# Patient Record
Sex: MALE | Race: WHITE | NOT HISPANIC OR LATINO | Employment: PART TIME | URBAN - METROPOLITAN AREA
[De-identification: names, ages, dates, MRNs, and addresses within clinical notes are randomized per-mention and may not be internally consistent; named-entity substitution may affect disease eponyms.]

---

## 2017-01-11 ENCOUNTER — OFFICE VISIT (OUTPATIENT)
Dept: FAMILY MEDICINE | Facility: CLINIC | Age: 47
End: 2017-01-11
Payer: COMMERCIAL

## 2017-01-11 VITALS
HEART RATE: 94 BPM | SYSTOLIC BLOOD PRESSURE: 136 MMHG | RESPIRATION RATE: 16 BRPM | TEMPERATURE: 98.4 F | BODY MASS INDEX: 40.62 KG/M2 | DIASTOLIC BLOOD PRESSURE: 84 MMHG | WEIGHT: 267.1 LBS

## 2017-01-11 DIAGNOSIS — E66.01 MORBID OBESITY DUE TO EXCESS CALORIES (H): ICD-10-CM

## 2017-01-11 DIAGNOSIS — M54.40 ACUTE RIGHT-SIDED LOW BACK PAIN WITH SCIATICA, SCIATICA LATERALITY UNSPECIFIED: Primary | ICD-10-CM

## 2017-01-11 PROBLEM — M51.369 DDD (DEGENERATIVE DISC DISEASE), LUMBAR: Status: ACTIVE | Noted: 2017-01-11

## 2017-01-11 PROCEDURE — 99213 OFFICE O/P EST LOW 20 MIN: CPT | Performed by: FAMILY MEDICINE

## 2017-01-11 RX ORDER — MELOXICAM 15 MG/1
15 TABLET ORAL DAILY
Qty: 20 TABLET | Refills: 1 | Status: SHIPPED | OUTPATIENT
Start: 2017-01-11 | End: 2017-05-03

## 2017-01-11 NOTE — Clinical Note
[facility]  [address] [place], [address]  [phone]        January 11, 2017    Martir Barrientos                                                                                                                                                       [address] [facility] [address]      Off work 1/9 to 1/11 for illness.           Miguel James MD

## 2017-01-11 NOTE — PROGRESS NOTES
SUBJECTIVE:                                                    Martir Barrientos is a 46 year old male who presents to clinic today for the following health issues:  Back Subjective:         Symptoms began:   3 year(s) ago       Symptoms changing:  onset gradual and are worse.                  Location:  low back bilateral region       Radiation to does not radiate       At worst a 6 on a scale of 1-10.         Personal hx of back pain is:  recurrent self limited episodes of low back pain in the past.       Pain is exacerbated by: his morbid obesity .       Pain is relieved by: lying down.       Associated sx include: none.       Previous plain films obtained: No.        Results: done at  show L5-S1 ddd.       Red flag symptoms: negative    He's missed two days of his sedentary job and sees a DC but has not exercise therapy. He wants his absence certified.   Body mass index is 40.62 kg/(m^2).  Jarad discussion of his morbid obesity and the continue ddd issues that have and will come with it. Offered PT referral and [facility].   (M54.40) Acute right-sided low back pain with sciatica, sciatica laterality unspecified  (primary encounter diagnosis)  Comment:   Plan: meloxicam (MOBIC) 15 MG tablet          He'll consider his treatment options     .

## 2017-01-11 NOTE — NURSING NOTE
"Chief Complaint   Patient presents with     Back Pain       Initial /84 mmHg  Pulse 94  Temp(Src) 98.4  F (36.9  C) (Oral)  Resp 16  Wt 267 lb 1.6 oz (121.156 kg) Estimated body mass index is 40.62 kg/(m^2) as calculated from the following:    Height as of 12/16/16: 5' 8\" (1.727 m).    Weight as of this encounter: 267 lb 1.6 oz (121.156 kg).  BP completed using cuff size: regular  Guilherme Claire CMA       "

## 2017-01-18 ENCOUNTER — OFFICE VISIT (OUTPATIENT)
Dept: FAMILY MEDICINE | Facility: CLINIC | Age: 47
End: 2017-01-18
Payer: COMMERCIAL

## 2017-01-18 VITALS
DIASTOLIC BLOOD PRESSURE: 70 MMHG | TEMPERATURE: 98.9 F | OXYGEN SATURATION: 98 % | SYSTOLIC BLOOD PRESSURE: 126 MMHG | WEIGHT: 263.13 LBS | BODY MASS INDEX: 43.84 KG/M2 | HEART RATE: 82 BPM | RESPIRATION RATE: 18 BRPM | HEIGHT: 65 IN

## 2017-01-18 DIAGNOSIS — M54.50 CHRONIC MIDLINE LOW BACK PAIN WITHOUT SCIATICA: Primary | ICD-10-CM

## 2017-01-18 DIAGNOSIS — G89.29 CHRONIC MIDLINE LOW BACK PAIN WITHOUT SCIATICA: Primary | ICD-10-CM

## 2017-01-18 PROCEDURE — 99213 OFFICE O/P EST LOW 20 MIN: CPT | Performed by: PHYSICIAN ASSISTANT

## 2017-01-18 RX ORDER — LIDOCAINE 50 MG/G
PATCH TOPICAL
Qty: 30 PATCH | Refills: 1 | Status: SHIPPED | OUTPATIENT
Start: 2017-01-18 | End: 2017-08-27

## 2017-01-18 RX ORDER — METHYLPREDNISOLONE 4 MG
TABLET, DOSE PACK ORAL
Qty: 21 TABLET | Refills: 0 | Status: SHIPPED | OUTPATIENT
Start: 2017-01-18 | End: 2017-04-12

## 2017-01-18 NOTE — Clinical Note
[facility]  [address] [facility] [address]  Phone: [phone]    January 18, 2017        Martir Barrientos  [address] [facility] [address]          To whom it may concern:    RE: Martir Barrientos    Patient was seen and treated today at our clinic and missed work.    Please contact me for questions or concerns.      Sincerely,        Elizabeth Mcneal PA-C

## 2017-01-18 NOTE — PROGRESS NOTES
SUBJECTIVE:                                                    Martir Barrientos is a 46 year old male who presents to clinic today for the following health issues:      Back Pain      Duration: x 1 month        Specific cause: none    Description:   Location of pain: low back left  Character of pain: sharp  Pain radiation:none  New numbness or weakness in legs, not attributed to pain:  no     Intensity: moderate    History:   Pain interferes with job: YES  History of back problems: no prior back problems  Any previous MRI or X-rays: Yes--at [facility].  Date December 2016  Sees a specialist for back pain:  No  Therapies tried without relief: Prednisone    Alleviating factors:   Improved by: Lidocaine 5% Patch      Precipitating factors:  Worsened by: Going from sitting to standing    Functional and Psychosocial Screen (Ko STarT Back):      Most recent score:    KO START BACK TOTAL SCORE 1/18/2017   Total Score (all 9) 5            Accompanying Signs & Symptoms:  Risk of Fracture:  None  Risk of Cauda Equina:  None  Risk of Infection:  None  Risk of Cancer:  None  Risk of Ankylosing Spondylitis:  Onset at age <35, male, AND morning back stiffness. no                      Patient is here today for ongoing back pain  He states this has been going on since December  He denies injury, woke up with 7-8/10 back pain  No shooting pain into legs, no numbness or tingling, no loss of bowel or bladder control  Has been seen multiple times, given anti-inflammatories, pain medication, muscle relaxants and patches  He states that he had an xray which showed arthritis  Notes pain worsens with change in the weather      Problem list and histories reviewed & adjusted, as indicated.  Additional history: as documented    Patient Active Problem List   Diagnosis     Morbid obesity (H)     DDD (degenerative disc disease), lumbar     No past surgical history on file.    Social History   Substance Use Topics  "    Smoking status: Former Smoker     Smokeless tobacco: Never Used     Alcohol Use: No     Family History   Problem Relation Age of Onset     Family History Negative Mother      Family History Negative Father          Current Outpatient Prescriptions   Medication Sig Dispense Refill     methylPREDNISolone (MEDROL DOSEPAK) 4 MG tablet Follow package instructions 21 tablet 0     lidocaine (LIDODERM) 5 % Patch Apply up to 3 patches to painful area at once for up to 12 h within a 24 h period.  Remove after 12 hours. 30 patch 1     fexofenadine-pseudoePHEDrine (ALLEGRA-D 12 HOUR)  MG per tablet Take 1 tablet by mouth 2 times daily 60 tablet 6     meloxicam (MOBIC) 15 MG tablet Take 1 tablet (15 mg) by mouth daily 20 tablet 1     methocarbamol (ROBAXIN) 750 MG tablet Take 1 tablet (750 mg) by mouth 3 times daily as needed 30 tablet 0     oxyCODONE-acetaminophen (PERCOCET) 5-325 MG per tablet Take 1 tablet by mouth every 6 hours as needed for pain 15 tablet 0     HYDROcodone-acetaminophen (NORCO) 5-325 MG per tablet 1/2-1 tab po every 4-6 hours PRN severe pain 15 tablet 0     fluticasone (FLONASE) 50 MCG/ACT nasal spray Spray 2 sprays into both nostrils daily 16 g 0     Lidocaine HCl 3 % LOTN Externally apply topically 2 times daily 1 Bottle 2     Allergies   Allergen Reactions     Penicillins        ROS:  Constitutional, HEENT, cardiovascular, pulmonary, gi and gu systems are negative, except as otherwise noted.    OBJECTIVE:                                                    /70 mmHg  Pulse 82  Temp(Src) 98.9  F (37.2  C) (Tympanic)  Resp 18  Ht 5' 5\" (1.651 m)  Wt 263 lb 2 oz (119.353 kg)  BMI 43.79 kg/m2  SpO2 98%  Body mass index is 43.79 kg/(m^2).  GENERAL: healthy, alert and no distress  NECK: no adenopathy, no asymmetry, masses, or scars and thyroid normal to palpation  RESP: lungs clear to auscultation - no rales, rhonchi or wheezes  CV: regular rate and rhythm, normal S1 S2, no S3 or S4, no " murmur, click or rub, no peripheral edema and peripheral pulses strong  ABDOMEN: soft, nontender, no hepatosplenomegaly, no masses and bowel sounds normal  MS: no gross musculoskeletal defects noted, no edema  Comprehensive back pain exam:  Tenderness of midspine in lumbar region, Range of motion not limited by pain, Lower extremity strength functional and equal on both sides and Straight leg raise negative bilaterally    Diagnostic Test Results:  none      ASSESSMENT/PLAN:                                                            1. Chronic midline low back pain without sciatica  Chronic issue, new to me, will trial Medrol Dosepack and refill the Lidocaine patches. Discussed with patient long term treatment of arthritis in back is weight loss and PT to encourage good low back mechanisms. Advised f/u with Ortho if symptoms persist or do not improve.  - methylPREDNISolone (MEDROL DOSEPAK) 4 MG tablet; Follow package instructions  Dispense: 21 tablet; Refill: 0  - [facility], AND CHIR[facility]RACTIC REFERRAL    Risks, benefits and alternatives were discussed with patient. Agreeable to the plan of care.      Elizabeth Mcneal PA-C  [facility]

## 2017-01-18 NOTE — MR AVS SNAPSHOT
After Visit Summary   1/18/2017    Martir Barrientos    MRN: [id number]           Patient Information     Date Of Birth          1970        Visit Information        Provider Department      1/18/2017 2:30 PM Elizabeth Mcneal PA-C [facility]        Today's Diagnoses     Acute midline low back pain without sciatica    -  1        Follow-ups after your visit        Additional Services     [facility], AND CHIR[facility]RACTIC REFERRAL       **This order will print in the [facility] Scheduling Office**    Physical Therapy, Hand Therapy and Chiropractic Care are available through:    *[facility]  *[facility]  *[facility]    Call one number to schedule at any of the above locations: [phone].    Your provider has referred you to: Physical Therapy at [facility] or [facility]    Indication/Reason for Referral: Low Back Pain  Onset of Illness: 1 month  Therapy Orders: Evaluate and Treat  Special Programs:   Special Request:     Ko Sub-Score (Q5-9): 3 (01/18/17 1400)  Ko Total Score (all 9): 5 (01/18/17 1400)    Additional Comments for the Therapist or Chiropractor:     Please be aware that coverage of these services is subject to the terms and limitations of your health insurance plan.  Call member services at your health plan with any benefit or coverage questions.      Please bring the following to your appointment:    *Your personal calendar for scheduling future appointments  *Comfortable clothing                  Who to contact     If you have questions or need follow up information about today's clinic visit or your schedule please contact [facility] directly at [phone].  Normal or non-critical lab and imaging results will be communicated to you by MyChart, letter or phone within 4 business days after the clinic has received the results. If you do not hear from us within 7 days, please contact the clinic  "through [employer] or phone. If you have a critical or abnormal lab result, we will notify you by phone as soon as possible.  Submit refill requests through Lapio or call your pharmacy and they will forward the refill request to us. Please allow 3 business days for your refill to be completed.          Additional Information About Your Visit        [employer] Information     Lapio lets you send messages to your doctor, view your test results, renew your prescriptions, schedule appointments and more. To sign up, go to [email or web address] . Click on \"Log in\" on the left side of the screen, which will take you to the Welcome page. Then click on \"Sign up Now\" on the right side of the page.     You will be asked to enter the access code listed below, as well as some personal information. Please follow the directions to create your username and password.     Your access code is: [email or web address]  Expires: 2017  2:33 PM     Your access code will  in 90 days. If you need help or a new code, please call your [facility] clinic or [phone].        Care EveryWhere ID     This is your Care EveryWhere ID. This could be used by other organizations to access your [facility] medical records  [id number]        Your Vitals Were     Pulse Temperature Respirations Height BMI (Body Mass Index) Pulse Oximetry    82 98.9  F (37.2  C) (Tympanic) 18 5' 5\" (1.651 m) 43.79 kg/m2 98%       Blood Pressure from Last 3 Encounters:   17 126/70   17 136/84   16 118/80    Weight from Last 3 Encounters:   17 263 lb 2 oz (119.353 kg)   17 267 lb 1.6 oz (121.156 kg)   16 267 lb (121.11 kg)              We Performed the Following     [facility], [facility] CH[facility]RACTIC REFERRAL          Where to get your medicines      These medications were sent to [id number] - [place], [place] - [address] [facility]  [address] [address]     Phone:  [phone]    - methylPREDNISolone 4 MG tablet    "   Call your pharmacy to confirm that your medication is ready for pickup. It may take up to 24 hours for them to receive the prescription. If the prescription is not ready within 3 business days, please contact your clinic or your provider.     We will let you know when these medications are ready. If you don't hear back within 3 business days, please contact us.    - lidocaine 5 % Patch       Primary Care Provider    Drew  Provider       No address on file        Thank you!     Thank you for choosing [facility]  for your care. Our goal is always to provide you with excellent care. Hearing back from our patients is one way we can continue to improve our services. Please take a few minutes to complete the written survey that you may receive in the mail after your visit with us. Thank you!             Your Updated Medication List - Protect others around you: Learn how to safely use, store and throw away your medicines at www.disposemymeds.org.          This list is accurate as of: 1/18/17  2:33 PM.  Always use your most recent med list.                   Brand Name Dispense Instructions for use    fexofenadine-pseudoePHEDrine  MG per 12 hr tablet    ALLEGRA-D 12 HOUR    60 tablet    Take 1 tablet by mouth 2 times daily       fluticasone 50 MCG/ACT spray    FLONASE    16 g    Spray 2 sprays into both nostrils daily       HYDROcodone-acetaminophen 5-325 MG per tablet    NORCO    15 tablet    1/2-1 tab po every 4-6 hours PRN severe pain       lidocaine 5 % Patch    LIDODERM    30 patch    Apply up to 3 patches to painful area at once for up to 12 h within a 24 h period.  Remove after 12 hours.       Lidocaine HCl 3 % Lotn     1 Bottle    Externally apply topically 2 times daily       meloxicam 15 MG tablet    MOBIC    20 tablet    Take 1 tablet (15 mg) by mouth daily       methocarbamol 750 MG tablet    ROBAXIN    30 tablet    Take 1 tablet (750 mg) by mouth 3 times daily as needed        methylPREDNISolone 4 MG tablet    MEDROL DOSEPAK    21 tablet    Follow package instructions       oxyCODONE-acetaminophen 5-325 MG per tablet    PERCOCET    15 tablet    Take 1 tablet by mouth every 6 hours as needed for pain

## 2017-01-19 ENCOUNTER — TELEPHONE (OUTPATIENT)
Dept: FAMILY MEDICINE | Facility: CLINIC | Age: 47
End: 2017-01-19

## 2017-01-19 NOTE — TELEPHONE ENCOUNTER
PRIOR AUTHORIZATION DENIED    Medication: lidocaine (LIDODERM) 5 % Patch - EPA DENIED    Denial Date: 1/19/2017    EPA DENIED: [facility] approves this medication for patients with pain associated with a diagnosis of Post-herpetic Neuralgia (pain after shingles), pain associated with cancer-related neuropathy (including treatment-related neuropathy [e.g. neuropathy associated with radiation treatment or chemotherapy]) or Diabetic Neuropathy.     Appeal Information:     [employer]    Prescription Claim Appeals  [address] [facility],     [address] [address]  [place] [address]  Fax:[phone]

## 2017-01-20 NOTE — TELEPHONE ENCOUNTER
Please encourage patient to purchase over the counter lidoderm patch (4%). Elizabeth Mcneal PA-C

## 2017-03-23 ENCOUNTER — OFFICE VISIT (OUTPATIENT)
Dept: URGENT CARE | Facility: URGENT CARE | Age: 47
End: 2017-03-23
Payer: COMMERCIAL

## 2017-03-23 VITALS
OXYGEN SATURATION: 100 % | HEART RATE: 83 BPM | BODY MASS INDEX: 43.1 KG/M2 | TEMPERATURE: 97.9 F | SYSTOLIC BLOOD PRESSURE: 124 MMHG | DIASTOLIC BLOOD PRESSURE: 70 MMHG | WEIGHT: 259 LBS

## 2017-03-23 DIAGNOSIS — G89.29 CHRONIC MIDLINE LOW BACK PAIN WITHOUT SCIATICA: Primary | ICD-10-CM

## 2017-03-23 DIAGNOSIS — M54.50 CHRONIC MIDLINE LOW BACK PAIN WITHOUT SCIATICA: Primary | ICD-10-CM

## 2017-03-23 DIAGNOSIS — M77.8 TENDINITIS OF RIGHT SHOULDER: ICD-10-CM

## 2017-03-23 PROCEDURE — 99213 OFFICE O/P EST LOW 20 MIN: CPT | Performed by: PHYSICIAN ASSISTANT

## 2017-03-23 RX ORDER — LIDOCAINE 50 MG/G
PATCH TOPICAL
Qty: 42 PATCH | Refills: 2 | Status: SHIPPED | OUTPATIENT
Start: 2017-03-23 | End: 2017-04-12

## 2017-03-23 ASSESSMENT — ENCOUNTER SYMPTOMS
NAUSEA: 0
DYSURIA: 0
CHILLS: 0
HEMATURIA: 0
FREQUENCY: 0
FOCAL WEAKNESS: 0
SHORTNESS OF BREATH: 0
FEVER: 0
BACK PAIN: 1
VOMITING: 0
ABDOMINAL PAIN: 0
DIARRHEA: 0

## 2017-03-23 NOTE — PROGRESS NOTES
"HPI  March 23, 2017    HPI: Martir Barrientos is a 47 year old male who complains of moderate R shoulder pain and low back pain which has been ongoing for the past 6-7 months but has been worse in the past week with the cold weather. He has been seen by ortho and diagnosed with \"tendonitis and arthritis\" in his lumbar spine and right shoulder. Denies new injury or trauma. He is currently going to PT which provides some relief but not when the weather is cold. He uses lidocaine patches but used his last one yesterday. Pt reports he has had steroid injections but was told by ortho he needs to \"take a break\" from them. Symptoms are constant in duration, worse with movement. No radiation of the pain. Denies fever/chills, bowel/bladder incontinence, saddle anesthesia, urinary sx, numbness, weakness, HA, N/V, or any other symptoms.    No past medical history on file.  No past surgical history on file.  Social History   Substance Use Topics     Smoking status: Former Smoker     Smokeless tobacco: Never Used     Alcohol use No       Problem list, Medication list, Allergies, and Medical/Social/Surgical histories reviewed in [facility] and updated as appropriate.      Review of Systems   Constitutional: Negative for chills and fever.   Respiratory: Negative for shortness of breath.    Cardiovascular: Negative for chest pain.   Gastrointestinal: Negative for abdominal pain, diarrhea, nausea and vomiting.   Genitourinary: Negative for dysuria, frequency, hematuria and urgency.   Musculoskeletal: Positive for back pain and joint pain.   Neurological: Negative for focal weakness.        No bowel or bladder incontinence. No saddle anesthesia   All other systems reviewed and are negative.        Physical Exam   Constitutional: He is oriented to person, place, and time and well-developed, well-nourished, and in no distress.   HENT:   Head: Normocephalic and atraumatic.   Cardiovascular:   Pulses:       Radial pulses are 2+ on the " right side, and 2+ on the left side.        Dorsalis pedis pulses are 2+ on the right side, and 2+ on the left side.   Musculoskeletal:        Right shoulder: He exhibits tenderness. He exhibits no bony tenderness.        Cervical back: Normal.        Thoracic back: Normal.        Lumbar back: He exhibits tenderness and pain.        Back:         Arms:  Dorsiflexion intact bilaterally. Negative SLR. Patient ambulatory.   Neurological: He is oriented to person, place, and time. He displays no weakness. Gait normal.   Strength 5/5 BLE   Nursing note and vitals reviewed.      Vital Signs  /70  Pulse 83  Temp 97.9  F (36.6  C) (Tympanic)  Wt 259 lb (117.5 kg)  SpO2 100%  BMI 43.1 kg/m2     Diagnostic Test Results:  none     ASSESSMENT/PLAN      ICD-10-CM    1. Chronic midline low back pain without sciatica M54.5 lidocaine (LIDODERM) 5 % Patch    G89.29    2. Tendinitis of right shoulder M75.81 lidocaine (LIDODERM) 5 % Patch        Pain c/w patient's chronic pain. Rx lidocaine patches, continue PT, f/u with ortho.    I have discussed any lab or imaging results, the patient's diagnosis, and my plan of treatment with the patient and/or family. Patient is aware to come back in if with worsening symptoms or if no relief despite treatment plan.  Patient voiced understanding and had no further questions.       Follow Up: Return if symptoms worsen or fail to improve.    GERA Live, [facility]

## 2017-03-23 NOTE — NURSING NOTE
"Chief Complaint   Patient presents with     Urgent Care     Tendon Injury     Pt has tendonitis in shoulder and lower lumbar, since its been cold its been very sore. 7-8/10       Initial /70  Pulse 83  Temp 97.9  F (36.6  C) (Tympanic)  Wt 259 lb (117.5 kg)  SpO2 100%  BMI 43.1 kg/m2 Estimated body mass index is 43.1 kg/(m^2) as calculated from the following:    Height as of 1/18/17: 5' 5\" (1.651 m).    Weight as of this encounter: 259 lb (117.5 kg).  Medication Reconciliation: complete    Salud Goodwin   "

## 2017-03-23 NOTE — LETTER
[facility]  [address]  [address]  [facility] [place] [address]  Phone: [phone]  Fax: [phone]    March 23, 2017        Martir Barrientos  [address] [address]          To whom it may concern:    RE: Martir Barrientos    Patient was seen and treated today at our clinic and missed work. Please excuse his absence for today.    Please contact me for questions or concerns.      Sincerely,        Blaire Horton PA-C

## 2017-03-23 NOTE — PATIENT INSTRUCTIONS
Follow up with primary care in 3-4 days if symptoms have not improved. Return to clinic or go to ER if symptoms worsen.      Relieving Back Pain  Back pain is a common problem. You can strain back muscles by lifting too much weight or just by moving the wrong way. Back strain can be uncomfortable, even painful. And it can take weeks or months to improve. To help yourself feel better and prevent future back strains, try these tips.  Important Note: Do not give aspirin to children or teens without first discussing it with your healthcare provider.     Ice    Ice reduces muscle pain and swelling. It helps most during the first 24 to 48 hours after an injury.    Wrap an ice pack or a bag of frozen peas in a thin towel. (Never place ice directly on your skin.)    Place the ice where your back hurts the most.    Don t ice for more than 20 minutes at a time.    You can use ice several times a day.     Medicines  Over-the-counter pain relievers can include acetaminophen and anti-inflammatory medicines, which includes aspirin or ibuprofen. They can help ease discomfort. Some also reduce swelling.    Tell your healthcare provider about any medicines you are already taking.    Take medicines only as directed.    Heat  After the first 48 hours, heat can relax sore muscles and improve blood flow.    Try a warm bath or shower. Or use a heating pad set on low. To prevent a burn, keep a cloth between you and the heating pad.    Don t use a heating pad for more than 15 minutes at a time. Never sleep on a heating pad.    1443-3068 The [employer]. [address] [place] [address], [place] [id number]. All rights reserved. This information is not intended as a substitute for professional medical care. Always follow your healthcare professional's instructions.      Tendonitis  A tendon is the thick fibrous cord that joins muscle to bone and allows joints to move. When a tendon becomes inflamed, it is called tendonitis. This can occur  from overuse, injury, or infection. This usually involves the shoulders, forearm, wrist, hands and foot. Symptoms include pain, swelling and tenderness to the touch. Moving the joint increases the pain.  It takes 4 to 6 weeks for tendonitis to heal. It is treated by preventing motion of the tendon with a splint or brace and the use of anti-inflammatory medicine.  Home care    Some people find relief with ice packs. These can be crushed or cubed ice in a plastic bag or a bag of frozen vegetables wrapped in a thin towel. Other people get better relief with heat. This can include a hot shower, hot bath, or a moist towel warmed in a microwave. Try each and use the method that feels best, for 15 to 20 minutes several times a day.    Rest the inflamed joint and protect it from movement.    You may use over-the-counter ibuprofen or naproxen to treat pain and inflammation, unless another medicine was prescribed. If you can't take these medicines, acetaminophen may help with the pain, but does not treat inflammation. If you have chronic liver or kidney disease or ever had a stomach ulcer or gastrointestinal bleeding, talk with your doctor before using these medicines.    As your symptoms improve, begin gradual motion at the involved joint.  Follow-up care  Follow up with your healthcare provider if you are not improving after 5 days of treatment.  When to seek medical advice  Call your healthcare provider right away if any of these occur:    Redness over the painful area    Increasing pain or swelling at the joint    Fever (1 degree above your normal temperature) lasting 24 to 48 hours Or, whatever your healthcare provider told you to report based on your condition    0232-8683 [employer]. [address] [place] [address], [place] [address]. All rights reserved. This information is not intended as a substitute for professional medical care. Always follow your healthcare professional's instructions.

## 2017-03-23 NOTE — MR AVS SNAPSHOT
After Visit Summary   3/23/2017    Martir Barrientos    MRN: [id number]           Patient Information     Date Of Birth          1970        Visit Information        Provider Department      3/23/2017 2:30 PM Blaire Horton PA-C [facility]        Today's Diagnoses     Chronic midline low back pain without sciatica    -  1    Tendinitis of right shoulder          Care Instructions    Follow up with primary care in 3-4 days if symptoms have not improved. Return to clinic or go to ER if symptoms worsen.      Relieving Back Pain  Back pain is a common problem. You can strain back muscles by lifting too much weight or just by moving the wrong way. Back strain can be uncomfortable, even painful. And it can take weeks or months to improve. To help yourself feel better and prevent future back strains, try these tips.  Important Note: Do not give aspirin to children or teens without first discussing it with your healthcare provider.     Ice    Ice reduces muscle pain and swelling. It helps most during the first 24 to 48 hours after an injury.    Wrap an ice pack or a bag of frozen peas in a thin towel. (Never place ice directly on your skin.)    Place the ice where your back hurts the most.    Don t ice for more than 20 minutes at a time.    You can use ice several times a day.     Medicines  Over-the-counter pain relievers can include acetaminophen and anti-inflammatory medicines, which includes aspirin or ibuprofen. They can help ease discomfort. Some also reduce swelling.    Tell your healthcare provider about any medicines you are already taking.    Take medicines only as directed.    Heat  After the first 48 hours, heat can relax sore muscles and improve blood flow.    Try a warm bath or shower. Or use a heating pad set on low. To prevent a burn, keep a cloth between you and the heating pad.    Don t use a heating pad for more than 15 minutes at a time. Never sleep on a  heating pad.    0771-4056 [employer]. [address] [place] [address], [place] [address]. All rights reserved. This information is not intended as a substitute for professional medical care. Always follow your healthcare professional's instructions.      Tendonitis  A tendon is the thick fibrous cord that joins muscle to bone and allows joints to move. When a tendon becomes inflamed, it is called tendonitis. This can occur from overuse, injury, or infection. This usually involves the shoulders, forearm, wrist, hands and foot. Symptoms include pain, swelling and tenderness to the touch. Moving the joint increases the pain.  It takes 4 to 6 weeks for tendonitis to heal. It is treated by preventing motion of the tendon with a splint or brace and the use of anti-inflammatory medicine.  Home care    Some people find relief with ice packs. These can be crushed or cubed ice in a plastic bag or a bag of frozen vegetables wrapped in a thin towel. Other people get better relief with heat. This can include a hot shower, hot bath, or a moist towel warmed in a microwave. Try each and use the method that feels best, for 15 to 20 minutes several times a day.    Rest the inflamed joint and protect it from movement.    You may use over-the-counter ibuprofen or naproxen to treat pain and inflammation, unless another medicine was prescribed. If you can't take these medicines, acetaminophen may help with the pain, but does not treat inflammation. If you have chronic liver or kidney disease or ever had a stomach ulcer or gastrointestinal bleeding, talk with your doctor before using these medicines.    As your symptoms improve, begin gradual motion at the involved joint.  Follow-up care  Follow up with your healthcare provider if you are not improving after 5 days of treatment.  When to seek medical advice  Call your healthcare provider right away if any of these occur:    Redness over the painful area    Increasing pain or  "swelling at the joint    Fever (1 degree above your normal temperature) lasting 24 to 48 hours Or, whatever your healthcare provider told you to report based on your condition    6702-7635 [employer]. [address] [place] [address], [place] [address]. All rights reserved. This information is not intended as a substitute for professional medical care. Always follow your healthcare professional's instructions.                Follow-ups after your visit        Follow-up notes from your care team     Return if symptoms worsen or fail to improve.      Who to contact     If you have questions or need follow up information about today's clinic visit or your schedule please contact [facility] directly at [phone].  Normal or non-critical lab and imaging results will be communicated to you by MyChart, letter or phone within 4 business days after the clinic has received the results. If you do not hear from us within 7 days, please contact the clinic through [employer] or phone. If you have a critical or abnormal lab result, we will notify you by phone as soon as possible.  Submit refill requests through [employer] or call your pharmacy and they will forward the refill request to us. Please allow 3 business days for your refill to be completed.          Additional Information About Your Visit        MyChart Information     You[employer] lets you send messages to your doctor, view your test results, renew your prescriptions, schedule appointments and more. To sign up, go to [email or web address] . Click on \"Log in\" on the left side of the screen, which will take you to the Welcome page. Then click on \"Sign up Now\" on the right side of the page.     You will be asked to enter the access code listed below, as well as some personal information. Please follow the directions to create your username and password.     Your access code is: [email or web address]  Expires: 2017  3:33 PM     Your access code will  in 90 " days. If you need help or a new code, please call your [place] clinic or [phone].        Care EveryWhere ID     This is your Care EveryWhere ID. This could be used by other organizations to access your [place] medical records  [id number]        Your Vitals Were     Pulse Temperature Pulse Oximetry BMI (Body Mass Index)          83 97.9  F (36.6  C) (Tympanic) 100% 43.1 kg/m2         Blood Pressure from Last 3 Encounters:   03/23/17 124/70   01/18/17 126/70   01/11/17 136/84    Weight from Last 3 Encounters:   03/23/17 259 lb (117.5 kg)   01/18/17 263 lb 2 oz (119.4 kg)   01/11/17 267 lb 1.6 oz (121.2 kg)              Today, you had the following     No orders found for display         Today's Medication Changes          These changes are accurate as of: 3/23/17  3:44 PM.  If you have any questions, ask your nurse or doctor.               These medicines have changed or have updated prescriptions.        Dose/Directions    * lidocaine 5 % Patch   Commonly known as:  LIDODERM   This may have changed:  Another medication with the same name was added. Make sure you understand how and when to take each.   Changed by:  Elizabeth Mcneal PA-C        Apply up to 3 patches to painful area at once for up to 12 h within a 24 h period.  Remove after 12 hours.   Quantity:  30 patch   Refills:  1       * lidocaine 5 % Patch   Commonly known as:  LIDODERM   This may have changed:  You were already taking a medication with the same name, and this prescription was added. Make sure you understand how and when to take each.   Used for:  Chronic midline low back pain without sciatica, Tendinitis of right shoulder   Changed by:  Blaire Horton PA-C        Apply up to 3 patches to affected area at once for up to 12 h within a 24 h period.   Quantity:  42 patch   Refills:  2       * Notice:  This list has 2 medication(s) that are the same as other medications prescribed for you. Read the directions carefully,  and ask your doctor or other care provider to review them with you.         Where to get your medicines      Call your pharmacy to confirm that your medication is ready for pickup. It may take up to 24 hours for them to receive the prescription. If the prescription is not ready within 3 business days, please contact your clinic or your provider.     We will let you know when these medications are ready. If you don't hear back within 3 business days, please contact us.     lidocaine 5 % Patch                Primary Care Provider    Drew  Provider       No address on file        Thank you!     Thank you for choosing [facility]  for your care. Our goal is always to provide you with excellent care. Hearing back from our patients is one way we can continue to improve our services. Please take a few minutes to complete the written survey that you may receive in the mail after your visit with us. Thank you!             Your Updated Medication List - Protect others around you: Learn how to safely use, store and throw away your medicines at www.disposemymeds.org.          This list is accurate as of: 3/23/17  3:44 PM.  Always use your most recent med list.                   Brand Name Dispense Instructions for use    fexofenadine-pseudoePHEDrine  MG per 12 hr tablet    ALLEGRA-D 12 HOUR    60 tablet    Take 1 tablet by mouth 2 times daily       fluticasone 50 MCG/ACT spray    FLONASE    16 g    Spray 2 sprays into both nostrils daily       HYDROcodone-acetaminophen 5-325 MG per tablet    NORCO    15 tablet    1/2-1 tab po every 4-6 hours PRN severe pain       * lidocaine 5 % Patch    LIDODERM    30 patch    Apply up to 3 patches to painful area at once for up to 12 h within a 24 h period.  Remove after 12 hours.       * lidocaine 5 % Patch    LIDODERM    42 patch    Apply up to 3 patches to affected area at once for up to 12 h within a 24 h period.       Lidocaine HCl 3 % Lotn     1 Bottle    Externally  apply topically 2 times daily       meloxicam 15 MG tablet    MOBIC    20 tablet    Take 1 tablet (15 mg) by mouth daily       methocarbamol 750 MG tablet    ROBAXIN    30 tablet    Take 1 tablet (750 mg) by mouth 3 times daily as needed       methylPREDNISolone 4 MG tablet    MEDROL DOSEPAK    21 tablet    Follow package instructions       oxyCODONE-acetaminophen 5-325 MG per tablet    PERCOCET    15 tablet    Take 1 tablet by mouth every 6 hours as needed for pain       * Notice:  This list has 2 medication(s) that are the same as other medications prescribed for you. Read the directions carefully, and ask your doctor or other care provider to review them with you.

## 2017-04-05 ENCOUNTER — TELEPHONE (OUTPATIENT)
Dept: URGENT CARE | Facility: URGENT CARE | Age: 47
End: 2017-04-05

## 2017-04-05 ENCOUNTER — OFFICE VISIT (OUTPATIENT)
Dept: URGENT CARE | Facility: URGENT CARE | Age: 47
End: 2017-04-05
Payer: COMMERCIAL

## 2017-04-05 VITALS
DIASTOLIC BLOOD PRESSURE: 80 MMHG | OXYGEN SATURATION: 95 % | TEMPERATURE: 98.3 F | SYSTOLIC BLOOD PRESSURE: 110 MMHG | HEART RATE: 81 BPM | BODY MASS INDEX: 42.7 KG/M2 | WEIGHT: 256.6 LBS

## 2017-04-05 DIAGNOSIS — J30.2 SEASONAL ALLERGIC RHINITIS, UNSPECIFIED ALLERGIC RHINITIS TRIGGER: Primary | ICD-10-CM

## 2017-04-05 DIAGNOSIS — M25.511 CHRONIC RIGHT SHOULDER PAIN: ICD-10-CM

## 2017-04-05 DIAGNOSIS — G89.29 CHRONIC RIGHT SHOULDER PAIN: ICD-10-CM

## 2017-04-05 PROCEDURE — 99213 OFFICE O/P EST LOW 20 MIN: CPT | Performed by: FAMILY MEDICINE

## 2017-04-05 RX ORDER — FLUTICASONE PROPIONATE 50 MCG
1-2 SPRAY, SUSPENSION (ML) NASAL DAILY
Qty: 1 BOTTLE | Refills: 1 | Status: SHIPPED | OUTPATIENT
Start: 2017-04-05 | End: 2017-04-12

## 2017-04-05 NOTE — PATIENT INSTRUCTIONS
Allergic Rhinitis  Allergic rhinitis is an allergic reaction that affects the nose, and often the eyes. It s often known as nasal allergies. Nasal allergies are often due to things in the environment that are breathed in. Depending what you are sensitive to, nasal allergies may occur only during certain seasons. Or they may occur year round. Common indoor allergens include house dust mites, mold, cockroaches, and pet dander. Outdoor allergens include pollen from trees, grasses, and weeds.   Symptoms include a drippy, stuffy, and itchy nose. They also include sneezing and red and itchy eyes. You may feel tired more often. Severe allergies may also affect your breathing and trigger a condition called asthma.   Tests can be done to see what allergens are affecting you. You may be referred to an allergy specialist for testing and further evaluation.  Home care  The healthcare provider may prescribe medicines to help relieve allergy symptoms.   Ask the provider for advice on how to avoid substances that you are allergic to. Below are a few tips for each type of allergen.  Pet dander:    Do not have pets with fur and feathers.    If you cannot avoid having a pet, keep it out of your bedroom and off upholstered furniture.  Pollen:    When pollen counts are high, keep windows of your car and home closed. If possible, use an air conditioner instead.    Wear a filter mask when mowing or doing yard work.  House dust mites:    Wash bedding every week in warm water and detergent and dry on a hot setting.    Cover the mattress, box spring, and pillows with allergy covers.     If possible, sleep in a room with no carpet, curtains, or upholstered furniture.  Cockroaches:    Store food in sealed containers.    Remove garbage from the home promptly.    Fix water leaks  Mold:    Keep humidity low by using a dehumidifier or air conditioner. Keep the dehumidifier and air conditioner clean and free of mold.    Clean moldy areas with  bleach and water.  In general:    Vacuum once or twice a week. If possible, use a vacuum with a high-efficiency particulate air (HEPA) filter.    Do not smoke. Avoid cigarette smoke. Cigarette smoke is an irritant that can make symptoms worse.  Follow-up care  Follow up as advised by the health care provider or our staff. If you were referred to an allergy specialist, make this appointment promptly.  When to seek medical advice  Call your healthcare provider right away if the following occur:    Coughing or wheezing    Fever greater than 100.4 F (38 C)    Continuing symptoms, new symptoms, or worsening symptoms  Call 911 right away if you have:    Trouble breathing    Hives (raised red bumps)    Severe swelling of the face or severe itching of the eyes or mouth    7800-0399 [employer]. [address] [place] [address], [place] [address]. All rights reserved. This information is not intended as a substitute for professional medical care. Always follow your healthcare professional's instructions.

## 2017-04-05 NOTE — TELEPHONE ENCOUNTER
[facility] pharmacist called asking if the Rx for  Claritin D 12-hr ordered by Dr Andrews for [facility] can be changed to Claritin D 24 hr as they don't carry the 12 hr.  If Dr Andrews is not at [facility] would another provider be willing to address this?  Routed: P 29138  [facility].  Mariposa BRANDON RN [facility]

## 2017-04-05 NOTE — MR AVS SNAPSHOT
After Visit Summary   4/5/2017    Martir Barrientos    MRN: [id number]           Patient Information     Date Of Birth          1970        Visit Information        Provider Department      4/5/2017 1:00 PM Micheal Andrews MD [facility]        Today's Diagnoses     Seasonal allergic rhinitis, unspecified allergic rhinitis trigger    -  1    Chronic right shoulder pain          Care Instructions      Allergic Rhinitis  Allergic rhinitis is an allergic reaction that affects the nose, and often the eyes. It s often known as nasal allergies. Nasal allergies are often due to things in the environment that are breathed in. Depending what you are sensitive to, nasal allergies may occur only during certain seasons. Or they may occur year round. Common indoor allergens include house dust mites, mold, cockroaches, and pet dander. Outdoor allergens include pollen from trees, grasses, and weeds.   Symptoms include a drippy, stuffy, and itchy nose. They also include sneezing and red and itchy eyes. You may feel tired more often. Severe allergies may also affect your breathing and trigger a condition called asthma.   Tests can be done to see what allergens are affecting you. You may be referred to an allergy specialist for testing and further evaluation.  Home care  The healthcare provider may prescribe medicines to help relieve allergy symptoms.   Ask the provider for advice on how to avoid substances that you are allergic to. Below are a few tips for each type of allergen.  Pet dander:    Do not have pets with fur and feathers.    If you cannot avoid having a pet, keep it out of your bedroom and off upholstered furniture.  Pollen:    When pollen counts are high, keep windows of your car and home closed. If possible, use an air conditioner instead.    Wear a filter mask when mowing or doing yard work.  House dust mites:    Wash bedding every week in warm water and detergent and dry on a hot  setting.    Cover the mattress, box spring, and pillows with allergy covers.     If possible, sleep in a room with no carpet, curtains, or upholstered furniture.  Cockroaches:    Store food in sealed containers.    Remove garbage from the home promptly.    Fix water leaks  Mold:    Keep humidity low by using a dehumidifier or air conditioner. Keep the dehumidifier and air conditioner clean and free of mold.    Clean moldy areas with bleach and water.  In general:    Vacuum once or twice a week. If possible, use a vacuum with a high-efficiency particulate air (HEPA) filter.    Do not smoke. Avoid cigarette smoke. Cigarette smoke is an irritant that can make symptoms worse.  Follow-up care  Follow up as advised by the health care provider or our staff. If you were referred to an allergy specialist, make this appointment promptly.  When to seek medical advice  Call your healthcare provider right away if the following occur:    Coughing or wheezing    Fever greater than 100.4 F (38 C)    Continuing symptoms, new symptoms, or worsening symptoms  Call 911 right away if you have:    Trouble breathing    Hives (raised red bumps)    Severe swelling of the face or severe itching of the eyes or mouth    9728-9252 [employer]. [address] [place] [address], [place] [address]. All rights reserved. This information is not intended as a substitute for professional medical care. Always follow your healthcare professional's instructions.              Follow-ups after your visit        Follow-up notes from your care team     Return if symptoms worsen or fail to improve.      Who to contact     If you have questions or need follow up information about today's clinic visit or your schedule please contact [facility] directly at [phone].  Normal or non-critical lab and imaging results will be communicated to you by MyChart, letter or phone within 4 business days after the clinic has received the results. If  "you do not hear from us within 7 days, please contact the clinic through [employer] or phone. If you have a critical or abnormal lab result, we will notify you by phone as soon as possible.  Submit refill requests through [employer] or call your pharmacy and they will forward the refill request to us. Please allow 3 business days for your refill to be completed.          Additional Information About Your Visit        [employer] Information     [employer] lets you send messages to your doctor, view your test results, renew your prescriptions, schedule appointments and more. To sign up, go to [email or web address] . Click on \"Log in\" on the left side of the screen, which will take you to the Welcome page. Then click on \"Sign up Now\" on the right side of the page.     You will be asked to enter the access code listed below, as well as some personal information. Please follow the directions to create your username and password.     Your access code is: [email or web address]  Expires: 2017  3:33 PM     Your access code will  in 90 days. If you need help or a new code, please call your [place] clinic or [phone].        Care EveryWhere ID     This is your Care EveryWhere ID. This could be used by other organizations to access your [place] medical records  [id number]        Your Vitals Were     Pulse Temperature Pulse Oximetry BMI (Body Mass Index)          81 98.3  F (36.8  C) (Tympanic) 95% 42.7 kg/m2         Blood Pressure from Last 3 Encounters:   17 110/80   17 124/70   17 126/70    Weight from Last 3 Encounters:   17 256 lb 9.6 oz (116.4 kg)   17 259 lb (117.5 kg)   17 263 lb 2 oz (119.4 kg)              Today, you had the following     No orders found for display         Today's Medication Changes          These changes are accurate as of: 17  2:05 PM.  If you have any questions, ask your nurse or doctor.               Start taking these medicines.        Dose/Directions    " loratadine-pseudoePHEDrine 5-120 MG per 12 hr tablet   Commonly known as:  CLARITIN-D 12-hour   Used for:  Seasonal allergic rhinitis, unspecified allergic rhinitis trigger   Started by:  Micheal Andrews MD        Dose:  1 tablet   Take 1 tablet by mouth 2 times daily   Quantity:  60 tablet   Refills:  0         These medicines have changed or have updated prescriptions.        Dose/Directions    * fluticasone 50 MCG/ACT spray   Commonly known as:  FLONASE   This may have changed:  Another medication with the same name was added. Make sure you understand how and when to take each.   Used for:  Seasonal allergic rhinitis   Changed by:  Yarely Bruner PA-C        Dose:  2 spray   Spray 2 sprays into both nostrils daily   Quantity:  16 g   Refills:  0       * fluticasone 50 MCG/ACT spray   Commonly known as:  FLONASE   This may have changed:  You were already taking a medication with the same name, and this prescription was added. Make sure you understand how and when to take each.   Used for:  Seasonal allergic rhinitis, unspecified allergic rhinitis trigger   Changed by:  Micheal Andrews MD        Dose:  1-2 spray   Spray 1-2 sprays into both nostrils daily   Quantity:  1 Bottle   Refills:  1       * Notice:  This list has 2 medication(s) that are the same as other medications prescribed for you. Read the directions carefully, and ask your doctor or other care provider to review them with you.         Where to get your medicines      These medications were sent to [facility]/pharmacy #[id number] - [facility] [address]  [address] [facility] [address]     Phone:  [phone]     fluticasone 50 MCG/ACT spray         Some of these will need a paper prescription and others can be bought over the counter.  Ask your nurse if you have questions.     Bring a paper prescription for each of these medications     loratadine-pseudoePHEDrine 5-120 MG per 12 hr tablet                Primary Care Provider    Drew Lopez  Provider       No address on file        Thank you!     Thank you for choosing [facility]  for your care. Our goal is always to provide you with excellent care. Hearing back from our patients is one way we can continue to improve our services. Please take a few minutes to complete the written survey that you may receive in the mail after your visit with us. Thank you!             Your Updated Medication List - Protect others around you: Learn how to safely use, store and throw away your medicines at www.disposemymeds.org.          This list is accurate as of: 4/5/17  2:05 PM.  Always use your most recent med list.                   Brand Name Dispense Instructions for use    fexofenadine-pseudoePHEDrine  MG per 12 hr tablet    ALLEGRA-D 12 HOUR    60 tablet    Take 1 tablet by mouth 2 times daily       * fluticasone 50 MCG/ACT spray    FLONASE    16 g    Spray 2 sprays into both nostrils daily       * fluticasone 50 MCG/ACT spray    FLONASE    1 Bottle    Spray 1-2 sprays into both nostrils daily       HYDROcodone-acetaminophen 5-325 MG per tablet    NORCO    15 tablet    1/2-1 tab po every 4-6 hours PRN severe pain       * lidocaine 5 % Patch    LIDODERM    30 patch    Apply up to 3 patches to painful area at once for up to 12 h within a 24 h period.  Remove after 12 hours.       * lidocaine 5 % Patch    LIDODERM    42 patch    Apply up to 3 patches to affected area at once for up to 12 h within a 24 h period.       Lidocaine HCl 3 % Lotn     1 Bottle    Externally apply topically 2 times daily       loratadine-pseudoePHEDrine 5-120 MG per 12 hr tablet    CLARITIN-D 12-hour    60 tablet    Take 1 tablet by mouth 2 times daily       meloxicam 15 MG tablet    MOBIC    20 tablet    Take 1 tablet (15 mg) by mouth daily       methocarbamol 750 MG tablet    ROBAXIN    30 tablet    Take 1 tablet (750 mg) by mouth 3 times daily as needed       methylPREDNISolone 4 MG tablet    MEDROL DOSEPAK    21  tablet    Follow package instructions       oxyCODONE-acetaminophen 5-325 MG per tablet    PERCOCET    15 tablet    Take 1 tablet by mouth every 6 hours as needed for pain       * Notice:  This list has 4 medication(s) that are the same as other medications prescribed for you. Read the directions carefully, and ask your doctor or other care provider to review them with you.

## 2017-04-05 NOTE — TELEPHONE ENCOUNTER
Spoke with the pharmacist and gave him the go ahead to change the prescription for Claritin D 24hr, 1 capsule qd, number 30 instead of the 12hr bid number 60 per Dr. Andrews.  The pharmacist explained to the patient how it works and the patient was okay with the change. Ruby Valdes/ALEXIS

## 2017-04-05 NOTE — TELEPHONE ENCOUNTER
Please contact pharmacy and patient regarding this, okay to change if patient desires this.  Does he need a new RX then?  Micheal Andrews MD  April 5, 2017 6:01 PM

## 2017-04-05 NOTE — NURSING NOTE
"Chief Complaint   Patient presents with     Urgent Care     Shoulder Pain     Pt states having right shoulder pain since yesterday. States does have history of tendonitis.      Allergies     Pt states has had watery eyes, itchy throat, and congestion.        Initial /80 (BP Location: Right arm, Patient Position: Chair, Cuff Size: Adult Regular)  Pulse 81  Temp 98.3  F (36.8  C) (Tympanic)  Wt 256 lb 9.6 oz (116.4 kg)  SpO2 95%  BMI 42.7 kg/m2 Estimated body mass index is 42.7 kg/(m^2) as calculated from the following:    Height as of 1/18/17: 5' 5\" (1.651 m).    Weight as of this encounter: 256 lb 9.6 oz (116.4 kg).  Medication Reconciliation: unable or not appropriate to perform   Marian Brooks CMA (AAMA) 4/5/2017 1:13 PM    "

## 2017-04-05 NOTE — LETTER
[facility]  [address]  [address]  [place] [address]  [phone]  Dept: [phone]      4/5/2017    Re: Martir Barrientos      TO WHOM IT MAY CONCERN:    Martir Barrientos  was seen on 4/5/2017.  Please excuse him from work 4/5/2017 due to illness.      Sven Andrews MD  [facility]

## 2017-04-05 NOTE — PROGRESS NOTES
SUBJECTIVE:  Chief Complaint   Patient presents with     Urgent Care     Shoulder Pain     Pt states having right shoulder pain since yesterday. States does have history of tendonitis.      Allergies     Pt states has had watery eyes, itchy throat, and congestion.      Martir Barrientos is a 47 year old male who presents with a chief complaint of right shoulder pain.  This is a chronic condition, denies any recent trauma.  Patient has been using lidoderm patches - 3 to area daily, ran out and is here for refill.  Patient states that has tried different medications in the past and had physical therapy.  Told that has arthritis and tendinitis in shoulder.    Patient states that has seasonal allergies and symptoms worsening, requesting refill of flonase.  Patient states that tried another allergy medication before and did not work, states that claritin D works better and requesting this script.    No past medical history on file.  Current Outpatient Prescriptions   Medication Sig Dispense Refill     lidocaine (LIDODERM) 5 % Patch Apply up to 3 patches to painful area at once for up to 12 h within a 24 h period.  Remove after 12 hours. 30 patch 1     fluticasone (FLONASE) 50 MCG/ACT nasal spray Spray 2 sprays into both nostrils daily 16 g 0     lidocaine (LIDODERM) 5 % Patch Apply up to 3 patches to affected area at once for up to 12 h within a 24 h period. (Patient not taking: Reported on 4/5/2017) 42 patch 2     methylPREDNISolone (MEDROL DOSEPAK) 4 MG tablet Follow package instructions (Patient not taking: Reported on 3/23/2017) 21 tablet 0     meloxicam (MOBIC) 15 MG tablet Take 1 tablet (15 mg) by mouth daily (Patient not taking: Reported on 3/23/2017) 20 tablet 1     methocarbamol (ROBAXIN) 750 MG tablet Take 1 tablet (750 mg) by mouth 3 times daily as needed (Patient not taking: Reported on 3/23/2017) 30 tablet 0     oxyCODONE-acetaminophen (PERCOCET) 5-325 MG per tablet Take 1 tablet by mouth every 6 hours  as needed for pain (Patient not taking: Reported on 3/23/2017) 15 tablet 0     HYDROcodone-acetaminophen (NORCO) 5-325 MG per tablet 1/2-1 tab po every 4-6 hours PRN severe pain (Patient not taking: Reported on 4/5/2017) 15 tablet 0     fexofenadine-pseudoePHEDrine (ALLEGRA-D 12 HOUR)  MG per tablet Take 1 tablet by mouth 2 times daily (Patient not taking: Reported on 4/5/2017) 60 tablet 6     Lidocaine HCl 3 % LOTN Externally apply topically 2 times daily (Patient not taking: Reported on 3/23/2017) 1 Bottle 2     Social History   Substance Use Topics     Smoking status: Former Smoker     Smokeless tobacco: Never Used     Alcohol use No       ROS:  CONSTITUTIONAL:NEGATIVE for fever, chills, change in weight  INTEGUMENTARY/SKIN: NEGATIVE for worrisome rashes, moles or lesions  ENT/MOUTH: POSITIVE for nasal congestion  RESP:NEGATIVE for significant cough or SOB  CV: NEGATIVE for chest pain, palpitations or peripheral edema  GI: NEGATIVE for nausea, abdominal pain, heartburn, or change in bowel habits  MUSCULOSKELETAL: POSITIVE  for arthralgias and joint pain     EXAM:   /80 (BP Location: Right arm, Patient Position: Chair, Cuff Size: Adult Regular)  Pulse 81  Temp 98.3  F (36.8  C) (Tympanic)  Wt 256 lb 9.6 oz (116.4 kg)  SpO2 95%  BMI 42.7 kg/m2  GENERAL APPEARANCE: healthy, alert and no distress  EXTREMITIES: peripheral pulses normal, decrease ROM right shoulder  SKIN: no suspicious lesions or rashes  NEURO: Normal strength and tone, sensory exam grossly normal, mentation intact and speech normal    ASSESSMENT/PLAN:  (J30.2) Seasonal allergic rhinitis, unspecified allergic rhinitis trigger  (primary encounter diagnosis)  Plan: fluticasone (FLONASE) 50 MCG/ACT spray,         loratadine-pseudoePHEDrine (CLARITIN-D 12-HOUR)        5-120 MG per 12 hr tablet            (M25.511,  G89.29) Chronic right shoulder pain  Plan: lidoderm refill still available      Reviewed that chronic conditions should be  managed by a primary care provider and not thru urgent care.  I reviewed lidoderm RX and noted that refills were available and recommend that patient notify pharmacy to refill his lidoderm patches.  No Xray needed and patient reported no recent trauma.  RX flonase given and RX claritin D #60 given for seasonal allergies.    Follow up with primary care provider for future refills.    Micheal Andrews MD  April 5, 2017 2:04 PM

## 2017-04-12 ENCOUNTER — OFFICE VISIT (OUTPATIENT)
Dept: URGENT CARE | Facility: URGENT CARE | Age: 47
End: 2017-04-12
Payer: COMMERCIAL

## 2017-04-12 VITALS
SYSTOLIC BLOOD PRESSURE: 127 MMHG | WEIGHT: 255 LBS | DIASTOLIC BLOOD PRESSURE: 78 MMHG | TEMPERATURE: 97.7 F | OXYGEN SATURATION: 96 % | BODY MASS INDEX: 42.43 KG/M2 | HEART RATE: 86 BPM

## 2017-04-12 DIAGNOSIS — H10.45 CHRONIC ALLERGIC CONJUNCTIVITIS: ICD-10-CM

## 2017-04-12 DIAGNOSIS — J30.89 SEASONAL ALLERGIC RHINITIS DUE TO OTHER ALLERGIC TRIGGER: Primary | ICD-10-CM

## 2017-04-12 PROCEDURE — 99214 OFFICE O/P EST MOD 30 MIN: CPT | Performed by: PHYSICIAN ASSISTANT

## 2017-04-12 RX ORDER — FLUTICASONE PROPIONATE 50 MCG
2 SPRAY, SUSPENSION (ML) NASAL DAILY
Qty: 16 G | Refills: 1 | Status: SHIPPED | OUTPATIENT
Start: 2017-04-12 | End: 2017-04-14

## 2017-04-12 RX ORDER — CETIRIZINE HYDROCHLORIDE 10 MG/1
10 TABLET ORAL EVERY EVENING
Qty: 30 TABLET | Refills: 1 | Status: SHIPPED | OUTPATIENT
Start: 2017-04-12 | End: 2017-04-14 | Stop reason: SINTOL

## 2017-04-12 NOTE — NURSING NOTE
"Chief Complaint   Patient presents with     Allergies     allergies sx's for one week.        Initial /78 (BP Location: Left arm, Patient Position: Chair, Cuff Size: Adult Large)  Pulse 86  Temp 97.7  F (36.5  C) (Oral)  Wt 255 lb (115.7 kg)  SpO2 96%  BMI 42.43 kg/m2 Estimated body mass index is 42.43 kg/(m^2) as calculated from the following:    Height as of 1/18/17: 5' 5\" (1.651 m).    Weight as of this encounter: 255 lb (115.7 kg).  Medication Reconciliation: complete    "

## 2017-04-12 NOTE — MR AVS SNAPSHOT
"              After Visit Summary   2017    Martir Barrientos    MRN: [id number]           Patient Information     Date Of Birth          1970        Visit Information        Provider Department      2017 2:00 PM Marina Lemus PA-C [facility]        Today's Diagnoses     Seasonal allergic rhinitis due to other allergic trigger    -  1    Chronic allergic conjunctivitis           Follow-ups after your visit        Who to contact     If you have questions or need follow up information about today's clinic visit or your schedule please contact [facility] directly at [phone].  Normal or non-critical lab and imaging results will be communicated to you by [employer], letter or phone within 4 business days after the clinic has received the results. If you do not hear from us within 7 days, please contact the clinic through [employer] or phone. If you have a critical or abnormal lab result, we will notify you by phone as soon as possible.  Submit refill requests through [email or web address] or call your pharmacy and they will forward the refill request to us. Please allow 3 business days for your refill to be completed.          Additional Information About Your Visit        MyChart Information     [email or web address] lets you send messages to your doctor, view your test results, renew your prescriptions, schedule appointments and more. To sign up, go to [email or web address] . Click on \"Log in\" on the left side of the screen, which will take you to the Welcome page. Then click on \"Sign up Now\" on the right side of the page.     You will be asked to enter the access code listed below, as well as some personal information. Please follow the directions to create your username and password.     Your access code is: [email or web address]  Expires: 2017  3:33 PM     Your access code will  in 90 days. If you need help or a new code, please call your [place] " clinic or [phone].        Care EveryWhere ID     This is your Care EveryWhere ID. This could be used by other organizations to access your [place] medical records  [id number]        Your Vitals Were     Pulse Temperature Pulse Oximetry BMI (Body Mass Index)          86 97.7  F (36.5  C) (Oral) 96% 42.43 kg/m2         Blood Pressure from Last 3 Encounters:   04/12/17 127/78   04/05/17 110/80   03/23/17 124/70    Weight from Last 3 Encounters:   04/12/17 255 lb (115.7 kg)   04/05/17 256 lb 9.6 oz (116.4 kg)   03/23/17 259 lb (117.5 kg)              Today, you had the following     No orders found for display         Today's Medication Changes          These changes are accurate as of: 4/12/17  3:08 PM.  If you have any questions, ask your nurse or doctor.               Start taking these medicines.        Dose/Directions    cetirizine 10 MG tablet   Commonly known as:  zyrTEC   Used for:  Seasonal allergic rhinitis due to other allergic trigger   Started by:  Marina Lemus PA-C        Dose:  10 mg   Take 1 tablet (10 mg) by mouth every evening   Quantity:  30 tablet   Refills:  1       ketotifen 0.025 % Soln ophthalmic solution   Commonly known as:  ZADITOR   Used for:  Chronic allergic conjunctivitis   Started by:  Marina Lemus PA-C        Dose:  1 drop   Place 1 drop into both eyes every 12 hours   Quantity:  1 Bottle   Refills:  0         These medicines have changed or have updated prescriptions.        Dose/Directions    * fluticasone 50 MCG/ACT spray   Commonly known as:  FLONASE   This may have changed:  Another medication with the same name was added. Make sure you understand how and when to take each.   Used for:  Seasonal allergic rhinitis   Changed by:  Yarely Bruner PA-C        Dose:  2 spray   Spray 2 sprays into both nostrils daily   Quantity:  16 g   Refills:  0       * fluticasone 50 MCG/ACT spray   Commonly known as:  FLONASE   This may have changed:  You  were already taking a medication with the same name, and this prescription was added. Make sure you understand how and when to take each.   Used for:  Seasonal allergic rhinitis due to other allergic trigger   Changed by:  Marina Lemus PA-C        Dose:  2 spray   Spray 2 sprays into both nostrils daily   Quantity:  16 g   Refills:  1       * Notice:  This list has 2 medication(s) that are the same as other medications prescribed for you. Read the directions carefully, and ask your doctor or other care provider to review them with you.         Where to get your medicines      These medications were sent to [facility]/pharmacy #[id number] - [facility] [address]  [address] [address] [facility] [address]     Phone:  [phone]     cetirizine 10 MG tablet    fluticasone 50 MCG/ACT spray    ketotifen 0.025 % Soln ophthalmic solution                Primary Care Provider    [facility] Provider       No address on file        Thank you!     Thank you for choosing [facility]  for your care. Our goal is always to provide you with excellent care. Hearing back from our patients is one way we can continue to improve our services. Please take a few minutes to complete the written survey that you may receive in the mail after your visit with us. Thank you!             Your Updated Medication List - Protect others around you: Learn how to safely use, store and throw away your medicines at www.disposemymeds.org.          This list is accurate as of: 4/12/17  3:08 PM.  Always use your most recent med list.                   Brand Name Dispense Instructions for use    cetirizine 10 MG tablet    zyrTEC    30 tablet    Take 1 tablet (10 mg) by mouth every evening       fexofenadine-pseudoePHEDrine  MG per 12 hr tablet    ALLEGRA-D 12 HOUR    60 tablet    Take 1 tablet by mouth 2 times daily       * fluticasone 50 MCG/ACT spray    FLONASE    16 g    Spray 2 sprays into both nostrils daily        * fluticasone 50 MCG/ACT spray    FLONASE    16 g    Spray 2 sprays into both nostrils daily       ketotifen 0.025 % Soln ophthalmic solution    ZADITOR    1 Bottle    Place 1 drop into both eyes every 12 hours       lidocaine 5 % Patch    LIDODERM    30 patch    Apply up to 3 patches to painful area at once for up to 12 h within a 24 h period.  Remove after 12 hours.       loratadine-pseudoePHEDrine 5-120 MG per 12 hr tablet    CLARITIN-D 12-hour    60 tablet    Take 1 tablet by mouth 2 times daily       meloxicam 15 MG tablet    MOBIC    20 tablet    Take 1 tablet (15 mg) by mouth daily       methocarbamol 750 MG tablet    ROBAXIN    30 tablet    Take 1 tablet (750 mg) by mouth 3 times daily as needed       * Notice:  This list has 2 medication(s) that are the same as other medications prescribed for you. Read the directions carefully, and ask your doctor or other care provider to review them with you.

## 2017-04-12 NOTE — PROGRESS NOTES
"SUBJECTIVE:   Martir Barrientos is a 47 year old male presenting with a chief complaint of   1) allergies are not responding to Claritin D.  He states that he took it for 2 days without relief.   2) he also complains of eye itching and watering.    Denies any new exposures.    He states that he has had allergies \"my whole life\"    He states that his PCP is in [place].  He was unable to get an appointment before next Monday.     He has tried allegra and claritin without relief.  He was using Flonase, but he ran out of it.        No past medical history on file.  Patient Active Problem List   Diagnosis     Morbid obesity (H)     DDD (degenerative disc disease), lumbar     Social History   Substance Use Topics     Smoking status: Former Smoker     Smokeless tobacco: Never Used     Alcohol use No       ROS:  CONSTITUTIONAL:NEGATIVE for fever, chills, change in weight  INTEGUMENTARY/SKIN: NEGATIVE for worrisome rashes, moles or lesions  EYES: as per HPI  ENT/MOUTH: as per HPI  RESP:NEGATIVE for significant cough or SOB  CV: NEGATIVE for chest pain, palpitations or peripheral edema  GI: NEGATIVE for nausea, abdominal pain, heartburn, or change in bowel habits  MUSCULOSKELETAL: NEGATIVE for significant arthralgias or myalgia    OBJECTIVE  :/78 (BP Location: Left arm, Patient Position: Chair, Cuff Size: Adult Large)  Pulse 86  Temp 97.7  F (36.5  C) (Oral)  Wt 255 lb (115.7 kg)  SpO2 96%  BMI 42.43 kg/m2  GENERAL APPEARANCE: healthy, alert and no distress  EYES: EOMI,  PERRL, conjunctiva clear, palpebral conjunctival cobblestoning.  HENT: ear canals and TM's normal.  Nose with boggy blue turbinates and mouth without ulcers, erythema or lesions  NECK: supple, nontender, no lymphadenopathy  RESP: lungs clear to auscultation - no rales, rhonchi or wheezes  CV: regular rates and rhythm, normal S1 S2, no murmur noted  ABDOMEN:  soft, nontender, no HSM or masses and bowel sounds normal  NEURO: Normal strength " and tone, sensory exam grossly normal,  normal speech and mentation  SKIN: no suspicious lesions or rashes    (J30.89) Seasonal allergic rhinitis due to other allergic trigger  (primary encounter diagnosis)  Comment:   Plan: cetirizine (ZYRTEC) 10 MG tablet, fluticasone         (FLONASE) 50 MCG/ACT spray            (H10.45) Chronic allergic conjunctivitis  Comment:   Plan: ketotifen (ZADITOR) 0.025 % SOLN ophthalmic         solution            Also, patient understands that if a pre-authorization is needed for a new allergy med, I advise him to discuss this with his PCP during his upcoming appointment.    Keep follow up with PCP on 4/17/17.

## 2017-04-12 NOTE — LETTER
[place] [facility]  [address] [address]  [phone]      April 12, 2017    RE:  Martir Barrientos                                                                                                                                                       [address] [facility] [address]            To whom it may concern:    Martir Barrientos was seen in clinic today.  He may return to work tomorrow.            Sincerely,        Marina Calderón [facility]

## 2017-04-14 ENCOUNTER — OFFICE VISIT (OUTPATIENT)
Dept: FAMILY MEDICINE | Facility: CLINIC | Age: 47
End: 2017-04-14
Payer: COMMERCIAL

## 2017-04-14 VITALS
OXYGEN SATURATION: 96 % | SYSTOLIC BLOOD PRESSURE: 122 MMHG | BODY MASS INDEX: 42.99 KG/M2 | HEART RATE: 119 BPM | TEMPERATURE: 98.6 F | WEIGHT: 258 LBS | HEIGHT: 65 IN | DIASTOLIC BLOOD PRESSURE: 72 MMHG

## 2017-04-14 DIAGNOSIS — E66.01 MORBID OBESITY DUE TO EXCESS CALORIES (H): ICD-10-CM

## 2017-04-14 DIAGNOSIS — J30.2 SEASONAL ALLERGIC RHINITIS, UNSPECIFIED ALLERGIC RHINITIS TRIGGER: ICD-10-CM

## 2017-04-14 DIAGNOSIS — H10.45 CHRONIC ALLERGIC CONJUNCTIVITIS: ICD-10-CM

## 2017-04-14 DIAGNOSIS — Z91.09 ENVIRONMENTAL ALLERGIES: Primary | ICD-10-CM

## 2017-04-14 PROCEDURE — 99213 OFFICE O/P EST LOW 20 MIN: CPT | Performed by: FAMILY MEDICINE

## 2017-04-14 RX ORDER — ALBUTEROL SULFATE 0.83 MG/ML
SOLUTION RESPIRATORY (INHALATION)
Refills: 0 | COMMUNITY
Start: 2016-11-28 | End: 2017-04-14

## 2017-04-14 RX ORDER — MONTELUKAST SODIUM 10 MG/1
10 TABLET ORAL AT BEDTIME
Qty: 30 TABLET | Refills: 1 | Status: SHIPPED | OUTPATIENT
Start: 2017-04-14 | End: 2017-05-03

## 2017-04-14 RX ORDER — FEXOFENADINE HCL AND PSEUDOEPHEDRINE HCI 60; 120 MG/1; MG/1
1 TABLET, EXTENDED RELEASE ORAL 2 TIMES DAILY
Qty: 60 TABLET | Refills: 1 | Status: SHIPPED | OUTPATIENT
Start: 2017-04-14 | End: 2017-07-25

## 2017-04-14 RX ORDER — ALBUTEROL SULFATE 0.83 MG/ML
1 SOLUTION RESPIRATORY (INHALATION) EVERY 4 HOURS PRN
Qty: 360 ML | Refills: 1 | Status: SHIPPED | OUTPATIENT
Start: 2017-04-14

## 2017-04-14 RX ORDER — FLUTICASONE PROPIONATE 50 MCG
2 SPRAY, SUSPENSION (ML) NASAL DAILY
Qty: 16 G | Refills: 1 | Status: SHIPPED | OUTPATIENT
Start: 2017-04-14 | End: 2017-07-14

## 2017-04-14 NOTE — LETTER
[address] [address]  Phone: [phone]   April 14, 2017    Martir Barrientos  [address] [facility] [address]      To whom it may concern:    Martir was seen in clinic today.    Sincerely,          Krishna Lee M.D.

## 2017-04-14 NOTE — MR AVS SNAPSHOT
After Visit Summary   4/14/2017    Martir Barrientos    MRN: [id number]           Patient Information     Date Of Birth          1970        Visit Information        Provider Department      4/14/2017 3:20 PM Krishna Lee MD [facility]        Today's Diagnoses     Environmental allergies    -  1    Seasonal allergic rhinitis, unspecified allergic rhinitis trigger        Chronic allergic conjunctivitis        Morbid obesity due to excess calories (H)          Care Instructions        [facility]                        To reach your care team during and after hours:   [phone]  To reach our pharmacy:        [phone]    Clinic Hours                        Our clinic hours are:    Monday   7:30 am to 7:00 pm                  Tuesday through Friday 7:30 am to 5:00 pm                             Saturday   8:00 am to 12:00 pm      Sunday   Closed      Pharmacy Hours                        Our pharmacy hours are:    Monday   8:30 am to 7:00 pm       Tuesday to Friday  8:30 am to 6:00 pm                       Saturday    9:00 am to 1:00 pm              Sunday    Closed              There is also information available at our web site:  [email or web address]    If your provider ordered any lab tests and you do not receive the results within 10 business days, please call the clinic.    If you need a medication refill please contact your pharmacy.  Please allow 2-3 business days for your refill to be completed.    Our clinic offers telephone visits and e visits.  Please ask one of your team members to explain more.      Use [email or web address] (secure email communication and access to your chart) to send your primary care provider a message or make an appointment. Ask someone on your Team how to sign up for Ter[employer]Echost.  Immunizations                      Immunization History   Administered Date(s) Administered     TDAP Vaccine (Boostrix) 12/01/2016        [facility]          "                Health Maintenance Due   Topic Date Due     KINGS QUESTIONNAIRE 1 YEAR  1970     Depression Assessment - yearly  1970     Tetanus Vaccine - every 10 years  1987     Cholesterol Lab - every 5 years  2004            Follow-ups after your visit        Who to contact     If you have questions or need follow up information about today's clinic visit or your schedule please contact [facility] directly at [phone].  Normal or non-critical lab and imaging results will be communicated to you by "[employer]hart, letter or phone within 4 business days after the clinic has received the results. If you do not hear from us within 7 days, please contact the clinic through Filmzut or phone. If you have a critical or abnormal lab result, we will notify you by phone as soon as possible.  Submit refill requests through GoGarden or call your pharmacy and they will forward the refill request to us. Please allow 3 business days for your refill to be completed.          Additional Information About Your Visit        GoGarden Information     GoGarden lets you send messages to your doctor, view your test results, renew your prescriptions, schedule appointments and more. To sign up, go to [email or web address] . Click on \"Log in\" on the left side of the screen, which will take you to the Welcome page. Then click on \"Sign up Now\" on the right side of the page.     You will be asked to enter the access code listed below, as well as some personal information. Please follow the directions to create your username and password.     Your access code is: [email or web address]  Expires: 2017  3:33 PM     Your access code will  in 90 days. If you need help or a new code, please call your [place] clinic or [phone].        Care EveryWhere ID     This is your Care EveryWhere ID. This could be used by other organizations to access your [place] medical records  [id number]        Your Vitals Were  " "   Pulse Temperature Height Pulse Oximetry BMI (Body Mass Index)       119 98.6  F (37  C) (Oral) 5' 5\" (1.651 m) 96% 42.93 kg/m2        Blood Pressure from Last 3 Encounters:   04/14/17 122/72   04/12/17 127/78   04/05/17 110/80    Weight from Last 3 Encounters:   04/14/17 258 lb (117 kg)   04/12/17 255 lb (115.7 kg)   04/05/17 256 lb 9.6 oz (116.4 kg)              Today, you had the following     No orders found for display         Today's Medication Changes          These changes are accurate as of: 4/14/17  4:30 PM.  If you have any questions, ask your nurse or doctor.               Start taking these medicines.        Dose/Directions    montelukast 10 MG tablet   Commonly known as:  SINGULAIR   Used for:  Environmental allergies, Seasonal allergic rhinitis, unspecified allergic rhinitis trigger   Started by:  Krishna Lee MD        Dose:  10 mg   Take 1 tablet (10 mg) by mouth At Bedtime   Quantity:  30 tablet   Refills:  1         These medicines have changed or have updated prescriptions.        Dose/Directions    albuterol (2.5 MG/3ML) 0.083% neb solution   This may have changed:  See the new instructions.   Used for:  Environmental allergies, Seasonal allergic rhinitis, unspecified allergic rhinitis trigger, Chronic allergic conjunctivitis   Changed by:  Krishna Lee MD        Dose:  1 vial   Take 1 vial (2.5 mg) by nebulization every 4 hours as needed for shortness of breath / dyspnea or wheezing   Quantity:  360 mL   Refills:  1       fluticasone 50 MCG/ACT spray   Commonly known as:  FLONASE   This may have changed:  Another medication with the same name was removed. Continue taking this medication, and follow the directions you see here.   Used for:  Seasonal allergic rhinitis, unspecified allergic rhinitis trigger, Environmental allergies   Changed by:  Krishna Lee MD        Dose:  2 spray   Spray 2 sprays into both nostrils daily   Quantity:  16 g   Refills:  1         Stop taking these medicines if " you haven't already. Please contact your care team if you have questions.     cetirizine 10 MG tablet   Commonly known as:  zyrTEC   Stopped by:  Krishna Lee MD           loratadine-pseudoePHEDrine 5-120 MG per 12 hr tablet   Commonly known as:  CLARITIN-D 12-hour   Stopped by:  Krishna Lee MD                Where to get your medicines      These medications were [facility]/pharmacy [facility][id number] - [place], [place] [facility] [address] [address]  [address] [facility] [facility] [address]     Phone:  [phone]     albuterol (2.5 MG/3ML) 0.083% neb solution    fluticasone 50 MCG/ACT spray    ketotifen 0.025 % Soln ophthalmic solution    montelukast 10 MG tablet         Some of these will need a paper prescription and others can be bought over the counter.  Ask your nurse if you have questions.     Bring a paper prescription for each of these medications     fexofenadine-pseudoePHEDrine  MG per 12 hr tablet                Primary Care Provider    [facility] Provider       No address on file        Thank you!     Thank you for choosing [facility]  for your care. Our goal is always to provide you with excellent care. Hearing back from our patients is one way we can continue to improve our services. Please take a few minutes to complete the written survey that you may receive in the mail after your visit with us. Thank you!             Your Updated Medication List - Protect others around you: Learn how to safely use, store and throw away your medicines at www.disposemymeds.org.          This list is accurate as of: 4/14/17  4:30 PM.  Always use your most recent med list.                   Brand Name Dispense Instructions for use    albuterol (2.5 MG/3ML) 0.083% neb solution     360 mL    Take 1 vial (2.5 mg) by nebulization every 4 hours as needed for shortness of breath / dyspnea or wheezing       fexofenadine-pseudoePHEDrine  MG per 12 hr tablet    ALLEGRA-D 12 HOUR    60 tablet    Take 1 tablet by mouth 2  times daily       fluticasone 50 MCG/ACT spray    FLONASE    16 g    Spray 2 sprays into both nostrils daily       ketotifen 0.025 % Soln ophthalmic solution    ZADITOR    1 Bottle    Place 1 drop into both eyes every 12 hours       lidocaine 5 % Patch    LIDODERM    30 patch    Apply up to 3 patches to painful area at once for up to 12 h within a 24 h period.  Remove after 12 hours.       meloxicam 15 MG tablet    MOBIC    20 tablet    Take 1 tablet (15 mg) by mouth daily       methocarbamol 750 MG tablet    ROBAXIN    30 tablet    Take 1 tablet (750 mg) by mouth 3 times daily as needed       montelukast 10 MG tablet    SINGULAIR    30 tablet    Take 1 tablet (10 mg) by mouth At Bedtime

## 2017-04-14 NOTE — PATIENT INSTRUCTIONS
[facility]                        To reach your care team during and after hours:   [phone]  To reach our pharmacy:        [phone]    Clinic Hours                        Our clinic hours are:    Monday   7:30 am to 7:00 pm                  Tuesday through Friday 7:30 am to 5:00 pm                             Saturday   8:00 am to 12:00 pm      Sunday   Closed      Pharmacy Hours                        Our pharmacy hours are:    Monday   8:30 am to 7:00 pm       Tuesday to Friday  8:30 am to 6:00 pm                       Saturday    9:00 am to 1:00 pm              Sunday    Closed              There is also information available at our web site:  [email or web address]    If your provider ordered any lab tests and you do not receive the results within 10 business days, please call the clinic.    If you need a medication refill please contact your pharmacy.  Please allow 2-3 business days for your refill to be completed.    Our clinic offers telephone visits and e visits.  Please ask one of your team members to explain more.      Use SoLatinat (secure email communication and access to your chart) to send your primary care provider a message or make an appointment. Ask someone on your Team how to sign up for [employer] Mobile.  Immunizations                      Immunization History   Administered Date(s) Administered     TDAP Vaccine (Boostrix) 12/01/2016        Health Maintenance                         Health Maintenance Due   Topic Date Due     KINGS QUESTIONNAIRE 1 YEAR  1970     Depression Assessment - yearly  1970     Tetanus Vaccine - every 10 years  01/23/1987     Cholesterol Lab - every 5 years  01/23/2004

## 2017-04-14 NOTE — PROGRESS NOTES
SUBJECTIVE:                                                    Martir Barrientos is a 47 year old male who presents to clinic today for the following health issues:    Allergies (dust, mold, pollen) since his childhood, and he typically treats his allergies all year round. He was seen in [facility] yesterday, and he was given zyrtec and Flonase. He notes that his symptoms are usually watery eyes, itchy eyes, scratchy throat, nasal congestion, and some wheezing. He uses an inhaler and has a nebulizer at home. He notes that Zyrtec is making eyes itch, but thinks that Flonase is working well (2 puffs once daily). He has been using Claritin D and Zyrtec together. He has used Allegra-D in the past which he thought worked well. Also he has been using the Ketotifen eye drops for his allergies but these are making his eyes burn (3 days ago) but he does feel that his eye symptoms are mildly improved.     He notes that he has to wear breath rights at night to keep his airway open.     Work: he works at the [employer] in [place]. He notes that his eyes do water more there.     Problem list and histories reviewed & adjusted, as indicated.  Additional history: as documented      Reviewed and updated as needed this visit by clinical staff  Tobacco  Allergies  Meds  Med Hx  Surg Hx  Fam Hx  Soc Hx      Reviewed and updated as needed this visit by Provider         BP Readings from Last 3 Encounters:   04/14/17 122/72   04/12/17 127/78   04/05/17 110/80       Wt Readings from Last 4 Encounters:   04/14/17 258 lb (117 kg)   04/12/17 255 lb (115.7 kg)   04/05/17 256 lb 9.6 oz (116.4 kg)   03/23/17 259 lb (117.5 kg)       Health Maintenance    Health Maintenance Due   Topic Date Due     KINGS QUESTIONNAIRE 1 YEAR  1970     PHQ-9 Q1YR (NO INBASKET)  1970     TETANUS IMMUNIZATION (SYSTEM ASSIGNED)  01/23/1987     LIPID SCREEN Q5 YR MALE (SYSTEM ASSIGNED)  01/23/2004       Current Problem List    Patient  Active Problem List   Diagnosis     Morbid obesity (H)     DDD (degenerative disc disease), lumbar     Environmental allergies       Past Medical History    Past Medical History:   Diagnosis Date     CARDIOVASCULAR SCREENING; LDL GOAL LESS THAN 130      Environmental allergies        Past Surgical History    History reviewed. No pertinent surgical history.    Current Medications    Current Outpatient Prescriptions   Medication Sig Dispense Refill     fexofenadine-pseudoePHEDrine (ALLEGRA-D 12 HOUR)  MG per 12 hr tablet Take 1 tablet by mouth 2 times daily 60 tablet 1     fluticasone (FLONASE) 50 MCG/ACT spray Spray 2 sprays into both nostrils daily 16 g 1     ketotifen (ZADITOR) 0.025 % SOLN ophthalmic solution Place 1 drop into both eyes every 12 hours 1 Bottle 1     montelukast (SINGULAIR) 10 MG tablet Take 1 tablet (10 mg) by mouth At Bedtime 30 tablet 1     albuterol (2.5 MG/3ML) 0.083% neb solution Take 1 vial (2.5 mg) by nebulization every 4 hours as needed for shortness of breath / dyspnea or wheezing 360 mL 1     lidocaine (LIDODERM) 5 % Patch Apply up to 3 patches to painful area at once for up to 12 h within a 24 h period.  Remove after 12 hours. 30 patch 1     meloxicam (MOBIC) 15 MG tablet Take 1 tablet (15 mg) by mouth daily 20 tablet 1     methocarbamol (ROBAXIN) 750 MG tablet Take 1 tablet (750 mg) by mouth 3 times daily as needed 30 tablet 0     [DISCONTINUED] albuterol (2.5 MG/3ML) 0.083% neb solution INHALE 2.5 MG BY NEBULAZATION EVERY 4 HOURS AS NEEDED  0     [DISCONTINUED] ketotifen (ZADITOR) 0.025 % SOLN ophthalmic solution Place 1 drop into both eyes every 12 hours 1 Bottle 0       Allergies    Allergies   Allergen Reactions     Dust Mites      Mold      Penicillins      Pollen Extract      Zyrtec [Cetirizine]      Itchy eyes       Immunizations    Immunization History   Administered Date(s) Administered     TDAP Vaccine (Boostrix) 12/01/2016       Family History    Family History  "  Problem Relation Age of Onset     Family History Negative Mother      Family History Negative Father        Social History    Social History     Social History     Marital status:      Spouse name: N/A     Number of children: N/A     Years of education: N/A     Occupational History     Not on file.     Social History Main Topics     Smoking status: Former Smoker     Smokeless tobacco: Never Used     Alcohol use No     Drug use: No     Sexual activity: No     Other Topics Concern     Not on file     Social History Narrative       All above reviewed and updated, all stable unless otherwise noted    Recent labs reviewed    ROS:  Constitutional, HEENT, cardiovascular, pulmonary, GI, , musculoskeletal, neuro, skin, endocrine and psych systems are negative, except as in HPI or otherwise noted      This document serves as a record of the services and decisions personally performed and made by Krishna Lee MD FAAFP. It was created on his behalf by Nica Johnson, a trained medical scribe. The creation of this document is based the provider's statements to the medical scribe.  Nica Johnson April 14, 2017 4:12 PM     OBJECTIVE:                                                    /72  Pulse 119  Temp 98.6  F (37  C) (Oral)  Ht 5' 5\" (1.651 m)  Wt 258 lb (117 kg)  SpO2 96%  BMI 42.93 kg/m2  Body mass index is 42.93 kg/(m^2).  GENERAL: healthy, alert and no distress  EYES: Eyes grossly normal to inspection, extraocular movements - intact, and PERRL  HENT: ear canals- normal; TMs- normal; Nose- normal; Mouth- no ulcers, no lesions  RESP: lungs clear to auscultation - no rales, no rhonchi, no wheezes  CV: regular rates and rhythm, normal S1 S2, no S3 or S4 and no murmur, no click or rub  ABDOMEN: soft, no tenderness, no  hepatosplenomegaly, no masses  MS: extremities- no gross deformities noted, no edema  SKIN: no suspicious lesions, no rashes  NEURO: strength and tone- normal, sensory exam- grossly normal, " mentation- intact, speech- normal, reflexes- symmetric  BACK: no CVA tenderness, no paralumbar tenderness  PSYCH: Alert and oriented times 3; speech- coherent , normal rate and volume; able to articulate logical thoughts, able to abstract reason, no tangential thoughts, no hallucinations or delusions, affect- normal    DIAGNOSTICS/PROCEDURES:                                                      No results found for this or any previous visit (from the past 24 hour(s)).     ASSESSMENT/PLAN:                                                        ICD-10-CM    1. Environmental allergies Z91.09 fexofenadine-pseudoePHEDrine (ALLEGRA-D 12 HOUR)  MG per 12 hr tablet     fluticasone (FLONASE) 50 MCG/ACT spray     ketotifen (ZADITOR) 0.025 % SOLN ophthalmic solution     montelukast (SINGULAIR) 10 MG tablet     albuterol (2.5 MG/3ML) 0.083% neb solution   2. Seasonal allergic rhinitis, unspecified allergic rhinitis trigger J30.2 fexofenadine-pseudoePHEDrine (ALLEGRA-D 12 HOUR)  MG per 12 hr tablet     fluticasone (FLONASE) 50 MCG/ACT spray     montelukast (SINGULAIR) 10 MG tablet     albuterol (2.5 MG/3ML) 0.083% neb solution   3. Chronic allergic conjunctivitis H10.45 ketotifen (ZADITOR) 0.025 % SOLN ophthalmic solution     albuterol (2.5 MG/3ML) 0.083% neb solution   4. Morbid obesity due to excess calories (H) E66.01        Discussed treatment/modality options, including risk and benefits, he desires medication change from Zyrtec to Allegra D and adding in Singulair, continue zaditor/flonase. Discussed switching to just Allegra in the future without the sudafed. All diagnosis above reviewed and noted above, otherwise stable.  See [facility] orders for further details.  Follow up with primary in 1 week. Work note provided for today.     Health Maintenance Due   Topic Date Due     KINGS QUESTIONNAIRE 1 YEAR  1970     PHQ-9 Q1YR (NO INBASKET)  1970     TETANUS IMMUNIZATION (SYSTEM ASSIGNED)  01/23/1987      LIPID SCREEN Q5 YR MALE (SYSTEM ASSIGNED)  01/23/2004     Follow up primary next week.     The information in this document, created by the medical scribe for me, accurately reflects the services I personally performed and the decisions made by me. I have reviewed and approved this document for accuracy prior to leaving the patient care area.    4/14/2017 4:12 PM            Krishna Lee MD [address]  [id number] [phone] Fax

## 2017-04-14 NOTE — NURSING NOTE
"Chief Complaint   Patient presents with     Allergies       Initial /72  Pulse 119  Temp 98.6  F (37  C) (Oral)  Ht 5' 5\" (1.651 m)  Wt 258 lb (117 kg)  SpO2 96%  BMI 42.93 kg/m2 Estimated body mass index is 42.93 kg/(m^2) as calculated from the following:    Height as of this encounter: 5' 5\" (1.651 m).    Weight as of this encounter: 258 lb (117 kg)..  BP completed using cuff size: stefanie Brasher MA  "

## 2017-04-15 ASSESSMENT — ANXIETY QUESTIONNAIRES
IF YOU CHECKED OFF ANY PROBLEMS ON THIS QUESTIONNAIRE, HOW DIFFICULT HAVE THESE PROBLEMS MADE IT FOR YOU TO DO YOUR WORK, TAKE CARE OF THINGS AT HOME, OR GET ALONG WITH OTHER PEOPLE: NOT DIFFICULT AT ALL
5. BEING SO RESTLESS THAT IT IS HARD TO SIT STILL: NOT AT ALL
3. WORRYING TOO MUCH ABOUT DIFFERENT THINGS: NOT AT ALL
1. FEELING NERVOUS, ANXIOUS, OR ON EDGE: NOT AT ALL
6. BECOMING EASILY ANNOYED OR IRRITABLE: NOT AT ALL
GAD7 TOTAL SCORE: 0
2. NOT BEING ABLE TO STOP OR CONTROL WORRYING: NOT AT ALL
7. FEELING AFRAID AS IF SOMETHING AWFUL MIGHT HAPPEN: NOT AT ALL

## 2017-04-15 ASSESSMENT — PATIENT HEALTH QUESTIONNAIRE - PHQ9: 5. POOR APPETITE OR OVEREATING: NOT AT ALL

## 2017-04-16 ASSESSMENT — PATIENT HEALTH QUESTIONNAIRE - PHQ9: SUM OF ALL RESPONSES TO PHQ QUESTIONS 1-9: 0

## 2017-04-16 ASSESSMENT — ANXIETY QUESTIONNAIRES: GAD7 TOTAL SCORE: 0

## 2017-04-21 PROBLEM — Z13.6 CARDIOVASCULAR SCREENING; LDL GOAL LESS THAN 130: Status: ACTIVE | Noted: 2017-04-21

## 2017-05-03 ENCOUNTER — OFFICE VISIT (OUTPATIENT)
Dept: URGENT CARE | Facility: URGENT CARE | Age: 47
End: 2017-05-03
Payer: COMMERCIAL

## 2017-05-03 VITALS
HEART RATE: 60 BPM | BODY MASS INDEX: 43.1 KG/M2 | DIASTOLIC BLOOD PRESSURE: 64 MMHG | OXYGEN SATURATION: 97 % | SYSTOLIC BLOOD PRESSURE: 94 MMHG | WEIGHT: 259 LBS | TEMPERATURE: 98.7 F

## 2017-05-03 DIAGNOSIS — R05.9 COUGH: ICD-10-CM

## 2017-05-03 DIAGNOSIS — J44.1 COPD EXACERBATION (H): ICD-10-CM

## 2017-05-03 PROCEDURE — 99213 OFFICE O/P EST LOW 20 MIN: CPT | Mod: 25 | Performed by: FAMILY MEDICINE

## 2017-05-03 PROCEDURE — 94640 AIRWAY INHALATION TREATMENT: CPT | Performed by: FAMILY MEDICINE

## 2017-05-03 RX ORDER — BENZONATATE 100 MG/1
200 CAPSULE ORAL 3 TIMES DAILY PRN
Qty: 42 CAPSULE | Refills: 3 | Status: SHIPPED | OUTPATIENT
Start: 2017-05-03 | End: 2017-07-25

## 2017-05-03 RX ORDER — PREDNISONE 20 MG/1
TABLET ORAL
Qty: 46 TABLET | Refills: 0 | Status: SHIPPED | OUTPATIENT
Start: 2017-05-03 | End: 2017-07-25

## 2017-05-03 RX ORDER — CODEINE PHOSPHATE AND GUAIFENESIN 10; 100 MG/5ML; MG/5ML
2 SOLUTION ORAL EVERY 6 HOURS PRN
Qty: 120 ML | Refills: 1 | Status: SHIPPED | OUTPATIENT
Start: 2017-05-03 | End: 2017-07-25

## 2017-05-03 NOTE — NURSING NOTE
The following nebulizer treatment was given:     MEDICATION: Albuterol Sulfate 2.5 mg  : Imperator  LOT #: [id number]  EXPIRATION DATE:  10/01/2018  NDC # [id number]  Marian Brooks CMA (AAMA) 5/3/2017 4:25 PM

## 2017-05-03 NOTE — NURSING NOTE
"Chief Complaint   Patient presents with     Urgent Care     Cough     Very difficult to breathe and coughing a lot.  Was diagnosed with bronchitis 10 days ago and was given a z-pack which did not help.  He has since been given Vibramycin, Deltasone tapering dose, Tessalon Perles and has had nebulizer treatments.  He also just started Chantix yesterday.     Initial BP 94/64  Pulse 60  Temp 98.7  F (37.1  C) (Oral)  Wt 259 lb (117.5 kg)  SpO2 97%  BMI 43.1 kg/m2 Estimated body mass index is 43.1 kg/(m^2) as calculated from the following:    Height as of 4/14/17: 5' 5\" (1.651 m).    Weight as of this encounter: 259 lb (117.5 kg).  BP completed using cuff size  large    Ruby Valdes/ALEXIS    "

## 2017-05-03 NOTE — PROGRESS NOTES
SUBJECTIVE:   Martir Barrientos is a 47 year old male smoker who recently quit smoking 9 days ago. He says he has a h/o asthma.   He is presenting with a chief complaint of shortness of breath, cough (thick mucus).  Patient is asking for an albuterol neb treatment during today's clinic encounter.   Onset of symptoms was 10 days ago.  Course of illness is comes and goes, worse at bedtimes. .    Severity severe   Current and Associated symptoms: chest congestion,   Treatment measures tried include Prednisone (60 mg once daily), Doxycycline, Albuterol nebs (BID), Benzonatate (100 mg TID PRN cough).  Predisposing factors include patient  Patient recently started Chantix.    Patient was evaluated at the [facility] 9 days ago for shortness, cough, wheezing.      Patient was evaluated at the [facility] again yesterday..  Patient was diagnosed with a COPD exacerbation, Acute Bronchitis, Cough.  Patient was started on the following medications:  Prednisone, Doxycycline, Albuterol nebs, Benzonatate (100 mg TID PRN cough).  Patient is still not feeling better since yesterday's clinic visit.    .    Past Medical History:   Diagnosis Date     CARDIOVASCULAR SCREENING; LDL GOAL LESS THAN 130      Chronic midline low back pain without sciatica      Environmental allergies      Current Outpatient Prescriptions   Medication Sig Dispense Refill     fexofenadine-pseudoePHEDrine (ALLEGRA-D 12 HOUR)  MG per 12 hr tablet Take 1 tablet by mouth 2 times daily 60 tablet 1     fluticasone (FLONASE) 50 MCG/ACT spray Spray 2 sprays into both nostrils daily 16 g 1     albuterol (2.5 MG/3ML) 0.083% neb solution Take 1 vial (2.5 mg) by nebulization every 4 hours as needed for shortness of breath / dyspnea or wheezing 360 mL 1     lidocaine (LIDODERM) 5 % Patch Apply up to 3 patches to painful area at once for up to 12 h within a 24 h period.  Remove after 12 hours. 30 patch 1     Social History    Substance Use Topics     Smoking status: Former Smoker     Smokeless tobacco: Never Used     Alcohol use No       ROS:  Review of systems negative except as stated above.    OBJECTIVE  :BP 94/64  Pulse 60  Temp 98.7  F (37.1  C) (Oral)  Wt 259 lb (117.5 kg)  SpO2 97%  BMI 43.1 kg/m2  GENERAL APPEARANCE: healthy, alert and no distress.  Patient has moderate amounts of coughing. Patient can speak in full sentences.    HENT: ear canals and TM's normal.  mouth without ulcers, erythema or lesions  NECK: supple, nontender, no lymphadenopathy  RESP: lungs clear to auscultation - no rales, rhonchi or wheezes  CV: regular rates and rhythm, normal S1 S2, no murmur noted  SKIN:  Pink and warm    ASSESSMENT:  COPD Exacerbation  Cough    PLAN:  Rx:  Cheratussin AC  Increase the dose of the Prednisone to 80 mg daily for 7 days then taper.   Increase the dose of the Benzonatate to 200 mg TID PRN cough  Albuterol nebs were administered during this clinic encounter. Patient felt better.   Continue the Chantix and smoking cessation  Increase the frequency of the Albuterol nebs to Q4 hours PRN instead of BID PRN.    Continue the Doxycycline.    See orders in Epic  follow up with the primary care provider if not better in 2 days.   Go to the emergency room if you develop worsening, severe shortness of breath.     Frankie Cheng MD

## 2017-05-03 NOTE — LETTER
[facility]  [address]  [address]  [place] [address] [phone]      May 3, 2017    RE:  Martir Barrientos                                                                                                                                                       [address] [address]            To whom it may concern:    Martir Barrientos is under my professional care at the [facility] on May 3, 2017.  Because of his current illness, please excuse his absences from work from May 3 to May 8, 2017.   He  may return to work on Tuesday, May 9, 2017, provided that he is feeling better.          Sincerely,        Frankie Cheng MD    [facility]

## 2017-05-03 NOTE — PATIENT INSTRUCTIONS
follow up with your primary care provider in 2 days if not better.     Continue quitting smoking and continue the Chantix.     Increase the frequency of the Albuterol nebs to every 4 hours as needed for shortness of breath, wheezing, chest tightness, cough    Increase the prednisone dose to 4 tablets by mouth daily x 7 days then 3 tablets daily for 3 days then 2 tablets daily for 3 days then 1 tablet daily for 3 days.     Go to the emergency room if you develop worsening, severe shortness of breath.

## 2017-05-03 NOTE — MR AVS SNAPSHOT
"              After Visit Summary   5/3/2017    Martir Barrientos    MRN: [id number]           Patient Information     Date Of Birth          1970        Visit Information        Provider Department      5/3/2017 3:25 PM Frankie Cheng MD [facility]        Today's Diagnoses     COPD exacerbation (H)        Cough          Care Instructions    follow up with your primary care provider in 2 days if not better.     Continue quitting smoking and continue the Chantix.     Increase the frequency of the Albuterol nebs to every 4 hours as needed for shortness of breath, wheezing, chest tightness, cough    Increase the prednisone dose to 4 tablets by mouth daily x 7 days then 3 tablets daily for 3 days then 2 tablets daily for 3 days then 1 tablet daily for 3 days.     Go to the emergency room if you develop worsening, severe shortness of breath.         Follow-ups after your visit        Who to contact     If you have questions or need follow up information about today's clinic visit or your schedule please contact [facility] directly at [phone].  Normal or non-critical lab and imaging results will be communicated to you by [employer], letter or phone within 4 business days after the clinic has received the results. If you do not hear from us within 7 days, please contact the clinic through [employer] or phone. If you have a critical or abnormal lab result, we will notify you by phone as soon as possible.  Submit refill requests through [employer] or call your pharmacy and they will forward the refill request to us. Please allow 3 business days for your refill to be completed.          Additional Information About Your Visit        [employer] Information     [employer] lets you send messages to your doctor, view your test results, renew your prescriptions, schedule appointments and more. To sign up, go to [email or web address] . Click on \"Log in\" on the left side of the screen, which will " "take you to the Welcome page. Then click on \"Sign up Now\" on the right side of the page.     You will be asked to enter the access code listed below, as well as some personal information. Please follow the directions to create your username and password.     Your access code is: [email or web address]  Expires: 2017  4:31 PM     Your access code will  in 90 days. If you need help or a new code, please call your [place] clinic or [phone].        Care EveryWhere ID     This is your Care EveryWhere ID. This could be used by other organizations to access your [facility] medical records  [id number]        Your Vitals Were     Pulse Temperature Pulse Oximetry BMI (Body Mass Index)          60 98.7  F (37.1  C) (Oral) 97% 43.1 kg/m2         Blood Pressure from Last 3 Encounters:   17 94/64   17 122/72   17 127/78    Weight from Last 3 Encounters:   17 259 lb (117.5 kg)   17 258 lb (117 kg)   17 255 lb (115.7 kg)              Today, you had the following     No orders found for display         Today's Medication Changes          These changes are accurate as of: 5/3/17  4:31 PM.  If you have any questions, ask your nurse or doctor.               Start taking these medicines.        Dose/Directions    benzonatate 100 MG capsule   Commonly known as:  TESSALON   Used for:  Cough   Started by:  Frankie Cheng MD        Dose:  200 mg   Take 2 capsules (200 mg) by mouth 3 times daily as needed   Quantity:  42 capsule   Refills:  3       guaiFENesin-codeine 100-10 MG/5ML Soln solution   Commonly known as:  ROBITUSSIN AC   Used for:  Cough   Started by:  Frankie Cheng MD        Dose:  2 tsp.   Take 10 mLs by mouth every 6 hours as needed   Quantity:  120 mL   Refills:  1       predniSONE 20 MG tablet   Commonly known as:  DELTASONE   Used for:  COPD exacerbation (H)   Started by:  Frankie Cheng MD        Take 4 tabs PO daily x 7 days then 3 tabs daily x 3 days then 2 tabs daily x 3 days then 1 " tab daily x 3 days.   Quantity:  46 tablet   Refills:  0            Where to get your medicines      Some of these will need a paper prescription and others can be bought over the counter.  Ask your nurse if you have questions.     Bring a paper prescription for each of these medications     benzonatate 100 MG capsule    guaiFENesin-codeine 100-10 MG/5ML Soln solution    predniSONE 20 MG tablet                Primary Care Provider Office Phone # Fax #    Miguel James [phone]       [facility] [id number] [place] [address]        Thank you!     Thank you for choosing [facility]  for your care. Our goal is always to provide you with excellent care. Hearing back from our patients is one way we can continue to improve our services. Please take a few minutes to complete the written survey that you may receive in the mail after your visit with us. Thank you!             Your Updated Medication List - Protect others around you: Learn how to safely use, store and throw away your medicines at www.disposemymeds.org.          This list is accurate as of: 5/3/17  4:31 PM.  Always use your most recent med list.                   Brand Name Dispense Instructions for use    albuterol (2.5 MG/3ML) 0.083% neb solution     360 mL    Take 1 vial (2.5 mg) by nebulization every 4 hours as needed for shortness of breath / dyspnea or wheezing       benzonatate 100 MG capsule    TESSALON    42 capsule    Take 2 capsules (200 mg) by mouth 3 times daily as needed       fexofenadine-pseudoePHEDrine  MG per 12 hr tablet    ALLEGRA-D 12 HOUR    60 tablet    Take 1 tablet by mouth 2 times daily       fluticasone 50 MCG/ACT spray    FLONASE    16 g    Spray 2 sprays into both nostrils daily       guaiFENesin-codeine 100-10 MG/5ML Soln solution    ROBITUSSIN AC    120 mL    Take 10 mLs by mouth every 6 hours as needed       lidocaine 5 % Patch    LIDODERM    30 patch     Apply up to 3 patches to painful area at once for up to 12 h within a 24 h period.  Remove after 12 hours.       predniSONE 20 MG tablet    DELTASONE    46 tablet    Take 4 tabs PO daily x 7 days then 3 tabs daily x 3 days then 2 tabs daily x 3 days then 1 tab daily x 3 days.

## 2017-06-05 ENCOUNTER — TELEPHONE (OUTPATIENT)
Dept: FAMILY MEDICINE | Facility: CLINIC | Age: 47
End: 2017-06-05

## 2017-06-05 NOTE — TELEPHONE ENCOUNTER
[facility] in [place] calling to check on the status of a refill sent over.  Request had been sent to an urgent care provider (Blaire Palomo PA-C).  Informed them UC providers do not provide refills.  Patient would need to contact PCP for a refill.  It was then that the pharmacist noticed patient also had a refill that was filled 6/4/17 yesterday waiting to be picked up and also had one on hold.  She did not want to send refill encounter.    Olamide DICKEY    [facility]

## 2017-06-28 DIAGNOSIS — Z91.09 ENVIRONMENTAL ALLERGIES: ICD-10-CM

## 2017-06-28 DIAGNOSIS — J30.2 CHRONIC SEASONAL ALLERGIC RHINITIS, UNSPECIFIED TRIGGER: ICD-10-CM

## 2017-06-28 NOTE — TELEPHONE ENCOUNTER
Singulair  -- In Med Rec     Last Written Prescription Date: 05/11/2017  Last Fill Quantity: 30, # refills: none noted    Last Office Visit with G, P or [facility] prescribing provider:  04/14/2017   Future Office Visit:       Date of Last Asthma Action Plan Letter:   There are no preventive care reminders to display for this patient.   Asthma Control Test: No flowsheet data found.    Date of Last Spirometry Test:   No results found for this or any previous visit.

## 2017-06-29 RX ORDER — MONTELUKAST SODIUM 10 MG/1
TABLET ORAL
Qty: 30 TABLET | Refills: 1 | Status: SHIPPED | OUTPATIENT
Start: 2017-06-29 | End: 2017-07-25

## 2017-06-29 NOTE — TELEPHONE ENCOUNTER
Routing refill request to provider for review/approval because:  Medication is reported/historical  Debra Borja RN  [employer]

## 2017-07-01 NOTE — TELEPHONE ENCOUNTER
Reason for Call: Patient called back and would like another call back    Best phone number to reach pt at is: [phone]  Ok to leave a message with medical info? YES    Pharmacy preferred (if calling for a refill): [facility]  [facility]-Patient Representative

## 2017-07-03 NOTE — TELEPHONE ENCOUNTER
Detailed message left for patient that he is due for fasting physical.    Nica Booth, MAYRA, RN, PHN  [facility]

## 2017-07-14 DIAGNOSIS — Z91.09 ENVIRONMENTAL ALLERGIES: ICD-10-CM

## 2017-07-14 DIAGNOSIS — J30.2 SEASONAL ALLERGIC RHINITIS: ICD-10-CM

## 2017-07-14 NOTE — TELEPHONE ENCOUNTER
Flonase      Last Written Prescription Date: 04/14/2107  Last Fill Quantity: 16g,  # refills: 1   Last Office Visit with G, P or [facility] prescribing provider: 04/14/2017

## 2017-07-18 RX ORDER — FLUTICASONE PROPIONATE 50 MCG
SPRAY, SUSPENSION (ML) NASAL
Qty: 16 ML | Refills: 1 | Status: SHIPPED | OUTPATIENT
Start: 2017-07-18

## 2017-07-18 NOTE — TELEPHONE ENCOUNTER
Prescription approved per [facility] Refill Protocol.    Nica Booth, BS, RN, N  [facility]) [phone]

## 2017-07-25 ENCOUNTER — OFFICE VISIT (OUTPATIENT)
Dept: URGENT CARE | Facility: URGENT CARE | Age: 47
End: 2017-07-25
Payer: COMMERCIAL

## 2017-07-25 VITALS
BODY MASS INDEX: 43.73 KG/M2 | HEART RATE: 94 BPM | DIASTOLIC BLOOD PRESSURE: 76 MMHG | WEIGHT: 262.8 LBS | TEMPERATURE: 98.1 F | OXYGEN SATURATION: 98 % | SYSTOLIC BLOOD PRESSURE: 122 MMHG

## 2017-07-25 DIAGNOSIS — J30.2 CHRONIC SEASONAL ALLERGIC RHINITIS, UNSPECIFIED TRIGGER: Primary | ICD-10-CM

## 2017-07-25 PROCEDURE — 99213 OFFICE O/P EST LOW 20 MIN: CPT | Performed by: PHYSICIAN ASSISTANT

## 2017-07-25 RX ORDER — FEXOFENADINE HCL AND PSEUDOEPHEDRINE HCI 60; 120 MG/1; MG/1
1 TABLET, EXTENDED RELEASE ORAL 2 TIMES DAILY
Qty: 28 TABLET | Refills: 1 | Status: SHIPPED | OUTPATIENT
Start: 2017-07-25 | End: 2017-07-25

## 2017-07-25 RX ORDER — FLUTICASONE PROPIONATE 50 MCG
1-2 SPRAY, SUSPENSION (ML) NASAL DAILY
Qty: 1 BOTTLE | Refills: 11 | Status: ON HOLD | OUTPATIENT
Start: 2017-07-25 | End: 2021-04-17

## 2017-07-25 NOTE — PATIENT INSTRUCTIONS
With distilled water       Allergic Rhinitis  Allergic rhinitis is an allergic reaction that affects the nose, and often the eyes. It s often known as nasal allergies. Nasal allergies are often due to things in the environment that are breathed in. Depending what you are sensitive to, nasal allergies may occur only during certain seasons. Or they may occur year round. Common indoor allergens include house dust mites, mold, cockroaches, and pet dander. Outdoor allergens include pollen from trees, grasses, and weeds.   Symptoms include a drippy, stuffy, and itchy nose. They also include sneezing and red and itchy eyes. You may feel tired more often. Severe allergies may also affect your breathing and trigger a condition called asthma.   Tests can be done to see what allergens are affecting you. You may be referred to an allergy specialist for testing and further evaluation.  Home care  The healthcare provider may prescribe medicines to help relieve allergy symptoms.   Ask the provider for advice on how to avoid substances that you are allergic to. Below are a few tips for each type of allergen.  Pet dander:    Do not have pets with fur and feathers.    If you cannot avoid having a pet, keep it out of your bedroom and off upholstered furniture.  Pollen:    When pollen counts are high, keep windows of your car and home closed. If possible, use an air conditioner instead.    Wear a filter mask when mowing or doing yard work.  House dust mites:    Wash bedding every week in warm water and detergent and dry on a hot setting.    Cover the mattress, box spring, and pillows with allergy covers.     If possible, sleep in a room with no carpet, curtains, or upholstered furniture.  Cockroaches:    Store food in sealed containers.    Remove garbage from the home promptly.    Fix water leaks  Mold:    Keep humidity low by using a dehumidifier or air conditioner. Keep the dehumidifier and air conditioner clean and free of  mold.    Clean moldy areas with bleach and water.  In general:    Vacuum once or twice a week. If possible, use a vacuum with a high-efficiency particulate air (HEPA) filter.    Do not smoke. Avoid cigarette smoke. Cigarette smoke is an irritant that can make symptoms worse.  Follow-up care  Follow up as advised by the health care provider or our staff. If you were referred to an allergy specialist, make this appointment promptly.  When to seek medical advice  Call your healthcare provider right away if the following occur:    Coughing or wheezing    Fever greater than 100.4 F (38 C)    Continuing symptoms, new symptoms, or worsening symptoms  Call 911 right away if you have:    Trouble breathing    Hives (raised red bumps)    Severe swelling of the face or severe itching of the eyes or mouth  Date Last Reviewed: 4/26/2015 2000-2017 [employer]. [address] [place] [address]. All rights reserved. This information is not intended as a substitute for professional medical care. Always follow your healthcare professional's instructions.

## 2017-07-25 NOTE — NURSING NOTE
"Chief Complaint   Patient presents with     Urgent Care     sinus       Initial /76  Pulse 94  Temp 98.1  F (36.7  C) (Oral)  Wt 262 lb 12.8 oz (119.2 kg)  SpO2 98%  BMI 43.73 kg/m2 Estimated body mass index is 43.73 kg/(m^2) as calculated from the following:    Height as of 4/14/17: 5' 5\" (1.651 m).    Weight as of this encounter: 262 lb 12.8 oz (119.2 kg).  Medication Reconciliation: complete   Abdias Lockwood CMA        "

## 2017-07-25 NOTE — MR AVS SNAPSHOT
After Visit Summary   7/25/2017    Martir Barrientos    MRN: [id number]           Patient Information     Date Of Birth          1970        Visit Information        Provider Department      7/25/2017 2:20 PM GRACIE  PROVIDER [facility]        Today's Diagnoses     Chronic seasonal allergic rhinitis, unspecified trigger    -  1      Care Instructions      With distilled water       Allergic Rhinitis  Allergic rhinitis is an allergic reaction that affects the nose, and often the eyes. It s often known as nasal allergies. Nasal allergies are often due to things in the environment that are breathed in. Depending what you are sensitive to, nasal allergies may occur only during certain seasons. Or they may occur year round. Common indoor allergens include house dust mites, mold, cockroaches, and pet dander. Outdoor allergens include pollen from trees, grasses, and weeds.   Symptoms include a drippy, stuffy, and itchy nose. They also include sneezing and red and itchy eyes. You may feel tired more often. Severe allergies may also affect your breathing and trigger a condition called asthma.   Tests can be done to see what allergens are affecting you. You may be referred to an allergy specialist for testing and further evaluation.  Home care  The healthcare provider may prescribe medicines to help relieve allergy symptoms.   Ask the provider for advice on how to avoid substances that you are allergic to. Below are a few tips for each type of allergen.  Pet dander:    Do not have pets with fur and feathers.    If you cannot avoid having a pet, keep it out of your bedroom and off upholstered furniture.  Pollen:    When pollen counts are high, keep windows of your car and home closed. If possible, use an air conditioner instead.    Wear a filter mask when mowing or doing yard work.  House dust mites:    Wash bedding every week in warm water and detergent and dry on a hot  setting.    Cover the mattress, box spring, and pillows with allergy covers.     If possible, sleep in a room with no carpet, curtains, or upholstered furniture.  Cockroaches:    Store food in sealed containers.    Remove garbage from the home promptly.    Fix water leaks  Mold:    Keep humidity low by using a dehumidifier or air conditioner. Keep the dehumidifier and air conditioner clean and free of mold.    Clean moldy areas with bleach and water.  In general:    Vacuum once or twice a week. If possible, use a vacuum with a high-efficiency particulate air (HEPA) filter.    Do not smoke. Avoid cigarette smoke. Cigarette smoke is an irritant that can make symptoms worse.  Follow-up care  Follow up as advised by the health care provider or our staff. If you were referred to an allergy specialist, make this appointment promptly.  When to seek medical advice  Call your healthcare provider right away if the following occur:    Coughing or wheezing    Fever greater than 100.4 F (38 C)    Continuing symptoms, new symptoms, or worsening symptoms  Call 911 right away if you have:    Trouble breathing    Hives (raised red bumps)    Severe swelling of the face or severe itching of the eyes or mouth  Date Last Reviewed: 4/26/2015 2000-2017 [employer]. [address] [place] [address]. All rights reserved. This information is not intended as a substitute for professional medical care. Always follow your healthcare professional's instructions.                Follow-ups after your visit        Who to contact     If you have questions or need follow up information about today's clinic visit or your schedule please contact [facility] directly at [phone].  Normal or non-critical lab and imaging results will be communicated to you by MyChart, letter or phone within 4 business days after the clinic has received the results. If you do not hear from us within 7 days, please contact the  "clinic through statusboomhart or phone. If you have a critical or abnormal lab result, we will notify you by phone as soon as possible.  Submit refill requests through [employer] or call your pharmacy and they will forward the refill request to us. Please allow 3 business days for your refill to be completed.          Additional Information About Your Visit        statusboomharRocketOn Information     [employer] lets you send messages to your doctor, view your test results, renew your prescriptions, schedule appointments and more. To sign up, go to [email or web address] . Click on \"Log in\" on the left side of the screen, which will take you to the Welcome page. Then click on \"Sign up Now\" on the right side of the page.     You will be asked to enter the access code listed below, as well as some personal information. Please follow the directions to create your username and password.     Your access code is: [email or web address]  Expires: 2017  4:31 PM     Your access code will  in 90 days. If you need help or a new code, please call your [facility] clinic or [phone].        Care EveryWhere ID     This is your Care EveryWhere ID. This could be used by other organizations to access your [facility] medical records  [id number]        Your Vitals Were     Pulse Temperature Pulse Oximetry BMI (Body Mass Index)          94 98.1  F (36.7  C) (Oral) 98% 43.73 kg/m2         Blood Pressure from Last 3 Encounters:   17 122/76   17 94/64   17 122/72    Weight from Last 3 Encounters:   17 262 lb 12.8 oz (119.2 kg)   17 259 lb (117.5 kg)   17 258 lb (117 kg)              Today, you had the following     No orders found for display         Today's Medication Changes          These changes are accurate as of: 17  2:38 PM.  If you have any questions, ask your nurse or doctor.               Start taking these medicines.        Dose/Directions    fexofenadine-pseudoePHEDrine  MG per 12 hr tablet   Commonly " known as:  ALLEGRA-D   Used for:  Chronic seasonal allergic rhinitis, unspecified trigger        Dose:  1 tablet   Take 1 tablet by mouth 2 times daily   Quantity:  28 tablet   Refills:  1         These medicines have changed or have updated prescriptions.        Dose/Directions    * fluticasone 50 MCG/ACT spray   Commonly known as:  FLONASE   This may have changed:  Another medication with the same name was added. Make sure you understand how and when to take each.   Used for:  Seasonal allergic rhinitis, Environmental allergies   Changed by:  Krishna Lee MD        SPRAY 2 SPRAYS INTO BOTH NOSTRILS DAILY   Quantity:  16 mL   Refills:  1       * fluticasone 50 MCG/ACT spray   Commonly known as:  FLONASE   This may have changed:  You were already taking a medication with the same name, and this prescription was added. Make sure you understand how and when to take each.   Used for:  Chronic seasonal allergic rhinitis, unspecified trigger        Dose:  1-2 spray   Spray 1-2 sprays into both nostrils daily   Quantity:  1 Bottle   Refills:  11       * Notice:  This list has 2 medication(s) that are the same as other medications prescribed for you. Read the directions carefully, and ask your doctor or other care provider to review them with you.         Where to get your medicines      These medications were sent to [facility]/pharmacy #[id number] - [place], [place] - [address]  [address] [facility] [address]     Phone:  [phone]     fluticasone 50 MCG/ACT spray         Some of these will need a paper prescription and others can be bought over the counter.  Ask your nurse if you have questions.     Bring a paper prescription for each of these medications     fexofenadine-pseudoePHEDrine  MG per 12 hr tablet                Primary Care Provider Office Phone # Fax #    Miguel James [phone]       [address] [address]        Equal  Access to Services     [facility]: Hadii aad ku hadgabbysiobhan Indu, waraizada luqadaha, qaybta kabaldomerojose alejandro guzmanroxiedalton de la torre. So [facility] [phone].    ATENCIÓN: Si desirae silver, tiene a yoon disposición servicios gratuitos de asistencia lingüística. Llame al [phone].    We comply with applicable federal civil rights laws and [place] laws. We do not discriminate on the basis of race, color, national origin, age, disability sex, sexual orientation or gender identity.            Thank you!     Thank you for choosing [facility]  for your care. Our goal is always to provide you with excellent care. Hearing back from our patients is one way we can continue to improve our services. Please take a few minutes to complete the written survey that you may receive in the mail after your visit with us. Thank you!             Your Updated Medication List - Protect others around you: Learn how to safely use, store and throw away your medicines at www.disposemymeds.org.          This list is accurate as of: 7/25/17  2:38 PM.  Always use your most recent med list.                   Brand Name Dispense Instructions for use Diagnosis    albuterol (2.5 MG/3ML) 0.083% neb solution     360 mL    Take 1 vial (2.5 mg) by nebulization every 4 hours as needed for shortness of breath / dyspnea or wheezing    Environmental allergies, Seasonal allergic rhinitis, unspecified allergic rhinitis trigger, Chronic allergic conjunctivitis       fexofenadine-pseudoePHEDrine  MG per 12 hr tablet    ALLEGRA-D    28 tablet    Take 1 tablet by mouth 2 times daily    Chronic seasonal allergic rhinitis, unspecified trigger       * fluticasone 50 MCG/ACT spray    FLONASE    16 mL    SPRAY 2 SPRAYS INTO BOTH NOSTRILS DAILY    Seasonal allergic rhinitis, Environmental allergies       * fluticasone 50 MCG/ACT spray    FLONASE    1 Bottle    Spray 1-2 sprays into both nostrils daily    Chronic seasonal  allergic rhinitis, unspecified trigger       lidocaine 5 % Patch    LIDODERM    30 patch    Apply up to 3 patches to painful area at once for up to 12 h within a 24 h period.  Remove after 12 hours.        * Notice:  This list has 2 medication(s) that are the same as other medications prescribed for you. Read the directions carefully, and ask your doctor or other care provider to review them with you.

## 2017-07-25 NOTE — PROGRESS NOTES
SUBJECTIVE:  Martir Barrientos is a 47 year old male with a chief complaint of seasonal allergy exacerbation.  Onset of symptoms was 1 week(s) ago.    Course of illness: worsening.  Severity moderate  Current and Associated symptoms: nasal congestion, rhinorrhea and sore throat  Treatment measures tried include allegra D, flonasae,   Predisposing factors include seasonal allergies.    Past Medical History:   Diagnosis Date     CARDIOVASCULAR SCREENING; LDL GOAL LESS THAN 130      Chronic midline low back pain without sciatica      Environmental allergies      Current Outpatient Prescriptions   Medication Sig Dispense Refill     fluticasone (FLONASE) 50 MCG/ACT spray SPRAY 2 SPRAYS INTO BOTH NOSTRILS DAILY 16 mL 1     lidocaine (LIDODERM) 5 % Patch Apply up to 3 patches to painful area at once for up to 12 h within a 24 h period.  Remove after 12 hours. 30 patch 1     albuterol (2.5 MG/3ML) 0.083% neb solution Take 1 vial (2.5 mg) by nebulization every 4 hours as needed for shortness of breath / dyspnea or wheezing (Patient not taking: Reported on 7/25/2017) 360 mL 1     Social History   Substance Use Topics     Smoking status: Former Smoker     Smokeless tobacco: Never Used     Alcohol use No       ROS:  Review of systems negative except as stated above.    OBJECTIVE:   /76  Pulse 94  Temp 98.1  F (36.7  C) (Oral)  Wt 262 lb 12.8 oz (119.2 kg)  SpO2 98%  BMI 43.73 kg/m2  GENERAL APPEARANCE: healthy, alert and no distress  EYES: EOMI,  PERRL, conjunctiva clear  HENT: ear canals and TM's normal.  Nose normal.  Pharynx erythematous with some exudate noted.  NECK: supple, non-tender to palpation, no adenopathy noted  RESP: lungs clear to auscultation - no rales, rhonchi or wheezes  CV: regular rates and rhythm, normal S1 S2, no murmur noted  SKIN: no suspicious lesions or rashes      ASSESSMENT:    (J30.2) Chronic seasonal allergic rhinitis, unspecified trigger  (primary encounter diagnosis)  Comment:  [place] patient  Plan: fluticasone (FLONASE) 50 MCG/ACT spray,         loratadine-pseudoePHEDrine (CLARITIN-D 24-HOUR)         MG per 24 hr tablet, DISCONTINUED:         fexofenadine-pseudoePHEDrine (ALLEGRA-D)         MG per 12 hr tablet      Patient Instructions     With distilled water       Allergic Rhinitis  Allergic rhinitis is an allergic reaction that affects the nose, and often the eyes. It s often known as nasal allergies. Nasal allergies are often due to things in the environment that are breathed in. Depending what you are sensitive to, nasal allergies may occur only during certain seasons. Or they may occur year round. Common indoor allergens include house dust mites, mold, cockroaches, and pet dander. Outdoor allergens include pollen from trees, grasses, and weeds.   Symptoms include a drippy, stuffy, and itchy nose. They also include sneezing and red and itchy eyes. You may feel tired more often. Severe allergies may also affect your breathing and trigger a condition called asthma.   Tests can be done to see what allergens are affecting you. You may be referred to an allergy specialist for testing and further evaluation.  Home care  The healthcare provider may prescribe medicines to help relieve allergy symptoms.   Ask the provider for advice on how to avoid substances that you are allergic to. Below are a few tips for each type of allergen.  Pet dander:    Do not have pets with fur and feathers.    If you cannot avoid having a pet, keep it out of your bedroom and off upholstered furniture.  Pollen:    When pollen counts are high, keep windows of your car and home closed. If possible, use an air conditioner instead.    Wear a filter mask when mowing or doing yard work.  House dust mites:    Wash bedding every week in warm water and detergent and dry on a hot setting.    Cover the mattress, box spring, and pillows with allergy covers.     If possible, sleep in a room with no carpet,  curtains, or upholstered furniture.  Cockroaches:    Store food in sealed containers.    Remove garbage from the home promptly.    Fix water leaks  Mold:    Keep humidity low by using a dehumidifier or air conditioner. Keep the dehumidifier and air conditioner clean and free of mold.    Clean moldy areas with bleach and water.  In general:    Vacuum once or twice a week. If possible, use a vacuum with a high-efficiency particulate air (HEPA) filter.    Do not smoke. Avoid cigarette smoke. Cigarette smoke is an irritant that can make symptoms worse.  Follow-up care  Follow up as advised by the health care provider or our staff. If you were referred to an allergy specialist, make this appointment promptly.  When to seek medical advice  Call your healthcare provider right away if the following occur:    Coughing or wheezing    Fever greater than 100.4 F (38 C)    Continuing symptoms, new symptoms, or worsening symptoms  Call 911 right away if you have:    Trouble breathing    Hives (raised red bumps)    Severe swelling of the face or severe itching of the eyes or mouth  Date Last Reviewed: 4/26/2015 2000-2017 [employer]. [address] [place] [address], [place] [address]. All rights reserved. This information is not intended as a substitute for professional medical care. Always follow your healthcare professional's instructions.

## 2017-08-07 DIAGNOSIS — Z91.09 ENVIRONMENTAL ALLERGIES: ICD-10-CM

## 2017-08-07 DIAGNOSIS — H10.45 CHRONIC ALLERGIC CONJUNCTIVITIS: ICD-10-CM

## 2017-08-07 NOTE — TELEPHONE ENCOUNTER
Never prescribed this medication - why does pt need this medication?     Called # [phone] (home)     Left a non detailed VM     Maryjo Casper RN, BSN  [facility]

## 2017-08-07 NOTE — TELEPHONE ENCOUNTER
[facility] itch relief 0.025%  -- In Med rec   Last Written Prescription Date: 07/13/2017  Last Fill Quantity: n/a,  # refills: n/a   Last Office Visit with [facility], [facility] or [facility] prescribing provider: 04/14/2017

## 2017-08-08 NOTE — TELEPHONE ENCOUNTER
Called #  [phone] (home)     Pt stated he does use this medication for seasonal allergies and puts 2 drops in each eye BID because the 1 drop does not help much   Advised pt that the directions are for only 1 drop in each eye Q12 hours     Pt stated that he has been doing the 2 drops in each eye BID for a long time and it works     Please review and advise     Thank you     Maryjo Casper RN, BSN  [facility] [place][note 2]

## 2017-08-08 NOTE — TELEPHONE ENCOUNTER
Kinga Lugo contacted Martir on 08/08/17 and left a message. If patient calls back please contact RN team.  Tiara Lugo RN

## 2017-08-08 NOTE — TELEPHONE ENCOUNTER
This medication is OTC.  The only eye drop I can find that has been prescribed is Zaditor.  The OTC drops will work for a while but they can often cause the condition to worsen as they shrink the blood vessels.  I would advise only Zaditor which is 1 drop twice daily.      If he chooses to use the [facility] eye drops then he can get it OTC.      Jana Chase MS, PA-C

## 2017-08-09 NOTE — TELEPHONE ENCOUNTER
Attempt #2  Called [phone] (home) - Left a nondetailed message    Yarely Calderon RN  [employer] T[facility]

## 2017-08-10 NOTE — TELEPHONE ENCOUNTER
RN spoke with patient.  He said he has been using the Zaditor and it is making his eyes and it has made his eyes worse. They are more itchy and red.  RN advised the [facility] drops are available OTC but he said pharmacy advised he needs rx for them.    Rx pending. Please review and approve if appropriate.    MAYRA Webster, RN, N  [facility]) [phone]

## 2017-08-22 DIAGNOSIS — J30.2 CHRONIC SEASONAL ALLERGIC RHINITIS, UNSPECIFIED TRIGGER: ICD-10-CM

## 2017-08-22 DIAGNOSIS — Z91.09 ENVIRONMENTAL ALLERGIES: ICD-10-CM

## 2017-08-22 NOTE — TELEPHONE ENCOUNTER
montelukast (SINGULAIR) 10 MG tablet (Discontinued)      Last Written Prescription Date: 6/29/2017  Last Fill Quantity: 30 tablet,  # refills: 1   Last Office Visit with [facility], [facility] or [facility] prescribing provider: 4/14/2017                                             Routing refill request to provider for review/approval because:  Drug not active on patient's medication list

## 2017-08-22 NOTE — LETTER
[facility]  [address] [place], [place], [address]  [phone]        August 25, 2017    Martir Barrientos                                                                                                                                                       [address] [facility] [address]            Dear Dr. Jesus Mcmahan was nice enough to refill your allergy pills but we'll need you to come in for a check up with one of us to take care of your general health needs so we can continue to treat your allergy and asthma problems. Schedule a physical early in the fall for this purpose.           Miguel James MD

## 2017-08-23 RX ORDER — MONTELUKAST SODIUM 10 MG/1
TABLET ORAL
Qty: 30 TABLET | Refills: 1 | Status: SHIPPED | OUTPATIENT
Start: 2017-08-23 | End: 2017-10-14

## 2017-08-23 NOTE — TELEPHONE ENCOUNTER
rx done, I have seen him only once as a walk in, please advise PCP to complete refills and he appears do for CPX

## 2017-08-27 DIAGNOSIS — G89.29 CHRONIC MIDLINE LOW BACK PAIN WITHOUT SCIATICA: Primary | ICD-10-CM

## 2017-08-27 DIAGNOSIS — M54.50 CHRONIC MIDLINE LOW BACK PAIN WITHOUT SCIATICA: Primary | ICD-10-CM

## 2017-08-28 RX ORDER — LIDOCAINE 50 MG/G
PATCH TOPICAL
Qty: 30 PATCH | Refills: 1 | Status: ON HOLD | OUTPATIENT
Start: 2017-08-28 | End: 2021-04-17

## 2017-08-28 NOTE — TELEPHONE ENCOUNTER
lidocaine (LIDODERM) 5 % Patch      Last Written Prescription Date: 1/18/17  Last Fill Quantity: 30, # refills: 1  Last Office Visit with G, P or [facility] prescribing provider: 4/14/17       Creatinine   Date Value Ref Range Status   06/26/2016 0.69 0.66 - 1.25 mg/dL Final

## 2017-09-07 ENCOUNTER — TELEPHONE (OUTPATIENT)
Dept: PEDIATRICS | Facility: CLINIC | Age: 47
End: 2017-09-07

## 2017-09-07 DIAGNOSIS — J30.2 CHRONIC SEASONAL ALLERGIC RHINITIS, UNSPECIFIED TRIGGER: ICD-10-CM

## 2017-09-08 NOTE — TELEPHONE ENCOUNTER
loratadine-pseudoePHEDrine (CLARITIN-D 24-HOUR)  MG per 24 hr tablet      Last Written Prescription Date:  7/25/2017  Last Fill Quantity: 14,   # refills: 1  Last Office Visit with [facility], [facility] or [facility] prescribing provider: 7/25/2017  Future Office visit:       Routing refill request to provider for review/approval because:  Drug not on the [facility], [facility] or [facility] refill protocol or controlled substance

## 2017-09-08 NOTE — TELEPHONE ENCOUNTER
Prescription faxed to [facility] pharmacy [place], [place] fax # [phone]. Loratadine-pseudoephedrine  Mg per 24 hr tablet.    Yarely Pierce/

## 2017-09-08 NOTE — TELEPHONE ENCOUNTER
One refill , needs to establish primary care with one of our clinics / providers not seen here in years

## 2017-09-08 NOTE — TELEPHONE ENCOUNTER
LM on phone informing Pt that office visit is required for pain patches at a [facility] clinic of his choice. Phone number give for assistance.  'Leslie HOWARD, ALEXIS,OG

## 2017-10-14 DIAGNOSIS — Z91.09 ENVIRONMENTAL ALLERGIES: ICD-10-CM

## 2017-10-14 DIAGNOSIS — J30.2 CHRONIC SEASONAL ALLERGIC RHINITIS, UNSPECIFIED TRIGGER: ICD-10-CM

## 2017-10-16 RX ORDER — MONTELUKAST SODIUM 10 MG/1
10 TABLET ORAL AT BEDTIME
Qty: 30 TABLET | Refills: 0 | Status: ON HOLD | OUTPATIENT
Start: 2017-10-16 | End: 2021-04-17

## 2017-10-16 NOTE — TELEPHONE ENCOUNTER
Disp Refills Start End JESÚS   montelukast (SINGULAIR) 10 MG tablet 30 tablet 1 8/23/2017  No   Sig: TAKE 1 TABLET (10 MG) BY MOUTH AT BEDTIME     Last Office Visit with [facility], [facility] or [facility] prescribing provider: 04/14/2017    Per last refill request:   Krishna Lee MD   5:57 PM   Note      rx done, I have seen him only once as a walk in, please advise PCP to complete refills and he appears do for CPX          August 25, 2017   Miguel James MD      3:10 PM   Note      I've seen him only as  walkin but I sent him a note to see one of us for a physical.             Patient due for fasting PX with Dr. James. Will route to Dr. James.     Yarely Calderon RN  [place]

## 2017-10-31 DIAGNOSIS — H10.45 CHRONIC ALLERGIC CONJUNCTIVITIS: ICD-10-CM

## 2017-10-31 NOTE — TELEPHONE ENCOUNTER
Not a patient at [facility]  Routing to PCP for review and refill as appropriate.  Thank you    Adina Jean RN

## 2018-01-16 ENCOUNTER — OFFICE VISIT (OUTPATIENT)
Dept: URGENT CARE | Facility: URGENT CARE | Age: 48
End: 2018-01-16
Payer: COMMERCIAL

## 2018-01-16 VITALS
WEIGHT: 262 LBS | HEART RATE: 90 BPM | OXYGEN SATURATION: 96 % | DIASTOLIC BLOOD PRESSURE: 75 MMHG | SYSTOLIC BLOOD PRESSURE: 131 MMHG | BODY MASS INDEX: 43.6 KG/M2 | TEMPERATURE: 98.8 F

## 2018-01-16 DIAGNOSIS — J45.901 EXACERBATION OF PERSISTENT ASTHMA, UNSPECIFIED ASTHMA SEVERITY: Primary | ICD-10-CM

## 2018-01-16 PROCEDURE — 99214 OFFICE O/P EST MOD 30 MIN: CPT | Performed by: FAMILY MEDICINE

## 2018-01-16 RX ORDER — ALBUTEROL SULFATE 90 UG/1
AEROSOL, METERED RESPIRATORY (INHALATION)
Refills: 1 | Status: ON HOLD | COMMUNITY
Start: 2017-10-13 | End: 2021-04-17

## 2018-01-16 RX ORDER — MONTELUKAST SODIUM 10 MG/1
10 TABLET ORAL AT BEDTIME
Qty: 30 TABLET | Refills: 0 | Status: ON HOLD | OUTPATIENT
Start: 2018-01-16 | End: 2021-04-17

## 2018-01-16 RX ORDER — ALBUTEROL SULFATE 90 UG/1
2 AEROSOL, METERED RESPIRATORY (INHALATION) EVERY 4 HOURS PRN
Qty: 1 INHALER | Refills: 0 | Status: SHIPPED | OUTPATIENT
Start: 2018-01-16

## 2018-01-16 RX ORDER — FLUTICASONE PROPIONATE 50 MCG
2 SPRAY, SUSPENSION (ML) NASAL DAILY
Qty: 1 BOTTLE | Refills: 0 | Status: ON HOLD | OUTPATIENT
Start: 2018-01-16 | End: 2021-04-17

## 2018-01-16 RX ORDER — ALBUTEROL SULFATE 0.83 MG/ML
1 SOLUTION RESPIRATORY (INHALATION) EVERY 4 HOURS PRN
Qty: 1 BOX | Refills: 0 | Status: SHIPPED | OUTPATIENT
Start: 2018-01-16 | End: 2018-05-20

## 2018-01-16 NOTE — NURSING NOTE
"Chief Complaint   Patient presents with     Urgent Care     Asthma     Pt states ran out of asthma medications and was having some sob earlier.        Initial /75 (BP Location: Right arm, Patient Position: Chair, Cuff Size: Adult Large)  Pulse 90  Temp 98.8  F (37.1  C) (Tympanic)  Wt 262 lb (118.8 kg)  SpO2 96%  BMI 43.6 kg/m2 Estimated body mass index is 43.6 kg/(m^2) as calculated from the following:    Height as of 4/14/17: 5' 5\" (1.651 m).    Weight as of this encounter: 262 lb (118.8 kg).  Medication Reconciliation: unable or not appropriate to perform   Marian Brooks CMA (OG) 1/16/2018 4:46 PM    "

## 2018-01-16 NOTE — PROGRESS NOTES
Martir Barrientos is a 47 year old male who presents to [facility] c/o of a mil asthma flare that started today upon stepping out into the cold weather.  He just ran out of his asthma medications and was told by the pharmacy that he didn't have any refills.  Currently, he feels that his breathing has improved from this morning.  He hasn't been seen by his primary care provider in quite some time.    No fevers or chills.  Not having any shortness of breath right now.  No chest pain.  Wants refills on his pain patch as well.    Past Medical History:   Diagnosis Date     CARDIOVASCULAR SCREENING; LDL GOAL LESS THAN 130      Chronic midline low back pain without sciatica      Environmental allergies      Social History     Social History     Marital status:      Spouse name: N/A     Number of children: N/A     Years of education: N/A     Occupational History     Not on file.     Social History Main Topics     Smoking status: Former Smoker     Smokeless tobacco: Never Used     Alcohol use No     Drug use: No     Sexual activity: No     Other Topics Concern     Not on file     Social History Narrative     ROS as above.    /75 (BP Location: Right arm, Patient Position: Chair, Cuff Size: Adult Large)  Pulse 90  Temp 98.8  F (37.1  C) (Tympanic)  Wt 262 lb (118.8 kg)  SpO2 96%  BMI 43.6 kg/m2  GEN: well-appearing, in NAD.  Able to speak in full sentences without any air hunger.  HEENT: anicteric sclerae, normal conjunctivae; oral MMM, normal pharynx; TMs without erythema, no middle ear effusions, canals with moderate cerumen  Neck:  Supple, no LAD  Lungs:  CTAB, good air entry bilaterally, no respiratory distress  CV:  RRR, no murmurs, normal S1 and S2    ASSESSMENT:  Mild asthma exacerbation  No evidence of infection triggering this mild flare.  Pt currently stable.    PLAN:  Discussed with patient that he cannot continue to get chronic medications refilled outside of his primary care provider since  it is to his benefit to have one provider working with him to manage his care.  [facility] care provider or one of Dr. James's partners before this round of medications runs out.  Discussed with patient that I am not going to refill the lidocaine patch because managing chronic pain issues is not within the scope of urgent care.    One-time Rx for albuterol MDI, albuterol nebs, loratadine-pseudoephedrine, and montelukast sent to pharmacy.

## 2018-01-16 NOTE — MR AVS SNAPSHOT
"              After Visit Summary   2018    Martir Barrientos    MRN: [id number]           Patient Information     Date Of Birth          1970        Visit Information        Provider Department      2018 4:40 PM Olamide Peter MD [facility] [place]        Today's Diagnoses     Exacerbation of persistent asthma, unspecified asthma severity    -  1       Follow-ups after your visit        Who to contact     If you have questions or need follow up information about today's clinic visit or your schedule please contact [facility]RG[facility] directly at [phone].  Normal or non-critical lab and imaging results will be communicated to you by [employer], letter or phone within 4 business days after the clinic has received the results. If you do not hear from us within 7 days, please contact the clinic through [employer] or phone. If you have a critical or abnormal lab result, we will notify you by phone as soon as possible.  Submit refill requests through [employer] or call your pharmacy and they will forward the refill request to us. Please allow 3 business days for your refill to be completed.          Additional Information About Your Visit        MyChart Information     [employer] lets you send messages to your doctor, view your test results, renew your prescriptions, schedule appointments and more. To sign up, go to [email or web address] . Click on \"Log in\" on the left side of the screen, which will take you to the Welcome page. Then click on \"Sign up Now\" on the right side of the page.     You will be asked to enter the access code listed below, as well as some personal information. Please follow the directions to create your username and password.     Your access code is: [email or web address]  Expires: 2018  5:25 PM     Your access code will  in 90 days. If you need help or a new code, please call your [place] clinic or [phone].        Care EveryWhere ID     This is your Care " EveryWhere ID. This could be used by other organizations to access your [place] medical records  [id number]        Your Vitals Were     Pulse Temperature Pulse Oximetry BMI (Body Mass Index)          90 98.8  F (37.1  C) (Tympanic) 96% 43.6 kg/m2         Blood Pressure from Last 3 Encounters:   01/16/18 131/75   07/25/17 122/76   05/03/17 94/64    Weight from Last 3 Encounters:   01/16/18 262 lb (118.8 kg)   07/25/17 262 lb 12.8 oz (119.2 kg)   05/03/17 259 lb (117.5 kg)              Today, you had the following     No orders found for display         Today's Medication Changes          These changes are accurate as of: 1/16/18  5:25 PM.  If you have any questions, ask your nurse or doctor.               These medicines have changed or have updated prescriptions.        Dose/Directions    * albuterol (2.5 MG/3ML) 0.083% neb solution   This may have changed:  Another medication with the same name was added. Make sure you understand how and when to take each.   Used for:  Environmental allergies, Seasonal allergic rhinitis, unspecified allergic rhinitis trigger, Chronic allergic conjunctivitis   Changed by:  Krishna Lee MD        Dose:  1 vial   Take 1 vial (2.5 mg) by nebulization every 4 hours as needed for shortness of breath / dyspnea or wheezing   Quantity:  360 mL   Refills:  1       * VENTOLIN  (90 BASE) MCG/ACT Inhaler   This may have changed:  Another medication with the same name was added. Make sure you understand how and when to take each.   Generic drug:  albuterol   Changed by:  Olamide Peter MD        INHALE 2 PUFFS EVERY 4HRS AS NEEDED   Refills:  1       * albuterol 108 (90 BASE) MCG/ACT Inhaler   Commonly known as:  PROAIR HFA/PROVENTIL HFA/VENTOLIN HFA   This may have changed:  You were already taking a medication with the same name, and this prescription was added. Make sure you understand how and when to take each.   Used for:  Exacerbation of persistent asthma, unspecified asthma  severity   Changed by:  Olamide Peter MD        Dose:  2 puff   Inhale 2 puffs into the lungs every 4 hours as needed for shortness of breath / dyspnea or wheezing   Quantity:  1 Inhaler   Refills:  0       * albuterol (2.5 MG/3ML) 0.083% neb solution   This may have changed:  You were already taking a medication with the same name, and this prescription was added. Make sure you understand how and when to take each.   Used for:  Exacerbation of persistent asthma, unspecified asthma severity   Changed by:  Olamide Peter MD        Dose:  1 vial   Take 1 vial (2.5 mg) by nebulization every 4 hours as needed for shortness of breath / dyspnea or wheezing   Quantity:  1 Box   Refills:  0       * fluticasone 50 MCG/ACT spray   Commonly known as:  FLONASE   This may have changed:  Another medication with the same name was added. Make sure you understand how and when to take each.   Used for:  Seasonal allergic rhinitis, Environmental allergies   Changed by:  Krishna Lee MD        SPRAY 2 SPRAYS INTO BOTH NOSTRILS DAILY   Quantity:  16 mL   Refills:  1       * fluticasone 50 MCG/ACT spray   Commonly known as:  FLONASE   This may have changed:  Another medication with the same name was added. Make sure you understand how and when to take each.   Used for:  Chronic seasonal allergic rhinitis, unspecified trigger   Changed by:  Bassam Ramon PA-C        Dose:  1-2 spray   Spray 1-2 sprays into both nostrils daily   Quantity:  1 Bottle   Refills:  11       * fluticasone 50 MCG/ACT spray   Commonly known as:  FLONASE   This may have changed:  You were already taking a medication with the same name, and this prescription was added. Make sure you understand how and when to take each.   Used for:  Exacerbation of persistent asthma, unspecified asthma severity   Changed by:  Olamide Peter MD        Dose:  2 spray   Spray 2 sprays into both nostrils daily   Quantity:  1 Bottle   Refills:  0       *  loratadine-pseudoePHEDrine  MG per 24 hr tablet   Commonly known as:  [facility] LORATADINE-D 24 HOUR   This may have changed:  Another medication with the same name was added. Make sure you understand how and when to take each.   Used for:  Chronic seasonal allergic rhinitis, unspecified trigger   Changed by:  Bassam Ramon PA-C        Dose:  1 tablet   Take 1 tablet by mouth daily NEEDS TO SEE MD   Quantity:  10 tablet   Refills:  0       * loratadine-pseudoePHEDrine  MG per 24 hr tablet   Commonly known as:  CLARITIN-D 24-hour   This may have changed:  You were already taking a medication with the same name, and this prescription was added. Make sure you understand how and when to take each.   Used for:  Exacerbation of persistent asthma, unspecified asthma severity   Changed by:  Olamide Peter MD        Dose:  1 tablet   Take 1 tablet by mouth daily   Quantity:  30 tablet   Refills:  0       * montelukast 10 MG tablet   Commonly known as:  SINGULAIR   This may have changed:  Another medication with the same name was added. Make sure you understand how and when to take each.   Used for:  Environmental allergies, Chronic seasonal allergic rhinitis, unspecified trigger   Changed by:  Krishna Lee MD        Dose:  10 mg   Take 1 tablet (10 mg) by mouth At Bedtime LAST REFILL SEE MD   Quantity:  30 tablet   Refills:  0       * montelukast 10 MG tablet   Commonly known as:  SINGULAIR   This may have changed:  You were already taking a medication with the same name, and this prescription was added. Make sure you understand how and when to take each.   Used for:  Exacerbation of persistent asthma, unspecified asthma severity   Changed by:  Olamide Peter MD        Dose:  10 mg   Take 1 tablet (10 mg) by mouth At Bedtime   Quantity:  30 tablet   Refills:  0       * Notice:  This list has 11 medication(s) that are the same as other medications prescribed for you. Read the directions carefully, and  ask your doctor or other care provider to review them with you.         Where to get your medicines      These medications were [facility] to [facility]/pharmacy #[id number] - [place], [place] [facility] [address] [address] [facility]  [address] [facility] [address]     Phone:  [phone]     albuterol (2.5 MG/3ML) 0.083% neb solution    albuterol 108 (90 BASE) MCG/ACT Inhaler    fluticasone 50 MCG/ACT spray    montelukast 10 MG tablet         Some of these will need a paper prescription and others can be bought over the counter.  Ask your nurse if you have questions.     Bring a paper prescription for each of these medications     loratadine-pseudoePHEDrine  MG per 24 hr tablet                Primary Care Provider Office Phone # Fax #    Miguel James [phone] [address] [facility] [address]        Equal Access to Services     [facility]: Hadii aad ku hadasho Soyasir, waaxda luqadaha, qaybta kaalmada adeegyada, jose alejandro ruiz . So [facility] [phone].    ATENCIÓN: Si habla español, tiene a yoon disposición servicios gratuitos de asistencia lingüística. [facility] [phone].    We comply with applicable federal civil rights laws and [place] laws. We do not discriminate on the basis of race, color, national origin, age, disability, sex, sexual orientation, or gender identity.            Thank you!     Thank you for choosing [facility]  for your care. Our goal is always to provide you with excellent care. Hearing back from our patients is one way we can continue to improve our services. Please take a few minutes to complete the written survey that you may receive in the mail after your visit with us. Thank you!             Your Updated Medication List - Protect others around you: Learn how to safely use, store and throw away your medicines at www.disposemymeds.org.          This list is accurate as of: 1/16/18  5:25 PM.  Always use your most recent med  list.                   Brand Name Dispense Instructions for use Diagnosis    * albuterol (2.5 MG/3ML) 0.083% neb solution     360 mL    Take 1 vial (2.5 mg) by nebulization every 4 hours as needed for shortness of breath / dyspnea or wheezing    Environmental allergies, Seasonal allergic rhinitis, unspecified allergic rhinitis trigger, Chronic allergic conjunctivitis       * VENTOLIN  (90 BASE) MCG/ACT Inhaler   Generic drug:  albuterol      INHALE 2 PUFFS EVERY 4HRS AS NEEDED        * albuterol 108 (90 BASE) MCG/ACT Inhaler    PROAIR HFA/PROVENTIL HFA/VENTOLIN HFA    1 Inhaler    Inhale 2 puffs into the lungs every 4 hours as needed for shortness of breath / dyspnea or wheezing    Exacerbation of persistent asthma, unspecified asthma severity       * albuterol (2.5 MG/3ML) 0.083% neb solution     1 Box    Take 1 vial (2.5 mg) by nebulization every 4 hours as needed for shortness of breath / dyspnea or wheezing    Exacerbation of persistent asthma, unspecified asthma severity       * fluticasone 50 MCG/ACT spray    FLONASE    16 mL    SPRAY 2 SPRAYS INTO BOTH NOSTRILS DAILY    Seasonal allergic rhinitis, Environmental allergies       * fluticasone 50 MCG/ACT spray    FLONASE    1 Bottle    Spray 1-2 sprays into both nostrils daily    Chronic seasonal allergic rhinitis, unspecified trigger       * fluticasone 50 MCG/ACT spray    FLONASE    1 Bottle    Spray 2 sprays into both nostrils daily    Exacerbation of persistent asthma, unspecified asthma severity       ketotifen 0.025 % Soln ophthalmic solution    CVS EYE ITCH RELIEF    1 Bottle    Place 2 drops into both eyes 2 times daily    Chronic allergic conjunctivitis       lidocaine 5 % Patch    LIDODERM    30 patch    PA NEEDED. APPLY UP TO 3 PATCHES TO PAINFUL AREA AT ONCE FOR UP TO 12 HR WITHIN A 24 HR PERIOD    Chronic midline low back pain without sciatica       * loratadine-pseudoePHEDrine  MG per 24 hr tablet    CVS LORATADINE-D 24 HOUR    10  tablet    Take 1 tablet by mouth daily NEEDS TO SEE MD    Chronic seasonal allergic rhinitis, unspecified trigger       * loratadine-pseudoePHEDrine  MG per 24 hr tablet    CLARITIN-D 24-hour    30 tablet    Take 1 tablet by mouth daily    Exacerbation of persistent asthma, unspecified asthma severity       * montelukast 10 MG tablet    SINGULAIR    30 tablet    Take 1 tablet (10 mg) by mouth At Bedtime LAST REFILL SEE MD    Environmental allergies, Chronic seasonal allergic rhinitis, unspecified trigger       * montelukast 10 MG tablet    SINGULAIR    30 tablet    Take 1 tablet (10 mg) by mouth At Bedtime    Exacerbation of persistent asthma, unspecified asthma severity       * Notice:  This list has 11 medication(s) that are the same as other medications prescribed for you. Read the directions carefully, and ask your doctor or other care provider to review them with you.

## 2018-02-28 DIAGNOSIS — H10.45 CHRONIC ALLERGIC CONJUNCTIVITIS: ICD-10-CM

## 2018-03-02 NOTE — TELEPHONE ENCOUNTER
"Requested Prescriptions   Pending Prescriptions Disp Refills     CVS EYE ITCH RELIEF 0.025 % SOLN ophthalmic solution [Pharmacy Med Name: CVS EYE ITCH RELIEF 0.025% DRP] 5 mL 1     Sig: PLACE 2 DROPS INTO BOTH EYES 2 TIMES DAILY    Last Refill:   Medication Detail         Disp Refills Start End JESÚS     ketotifen (CVS EYE ITCH RELIEF) 0.025 % SOLN ophthalmic solution 1 Bottle 1 8/10/2017  --     Sig: Place 2 drops into both eyes 2 times daily       Miscellaneous Opthalmic Allergy Drops Protocol Passed    2/28/2018 12:27 PM       Passed - Patient is age 4 or older       Passed - Recent or future visit with authorizing provider's specialty    Patient had office visit in the last year or has a visit in the next 30 days with authorizing provider.  See \"Patient Info\" tab in inbasket, or \"Choose Columns\" in Meds & Orders section of the refill encounter.     LOV: 04/14/2017          Refilled per RN Protocol.     Yarely Calderon RN  [place] Triage      "

## 2018-04-03 DIAGNOSIS — H10.45 CHRONIC ALLERGIC CONJUNCTIVITIS: ICD-10-CM

## 2018-04-03 NOTE — TELEPHONE ENCOUNTER
Prescription approved per [facility], [facility] or [facility] refill protocol.  Debra Borja RN - Triage  [facility]

## 2018-04-03 NOTE — TELEPHONE ENCOUNTER
"Requested Prescriptions   Pending Prescriptions Disp Refills     CVS EYE ITCH RELIEF 0.025 % SOLN ophthalmic solution [Pharmacy Med Name: CVS EYE ITCH RELIEF 0.025% DRP]  Last Written Prescription Date:  3/2/18  Last Fill Quantity: 5mL,  # refills: 0   Last office visit: 4/14/2017 with prescribing provider:  4/14/17   Future Office Visit:     5 mL 0     Sig: PLACE 2 DROPS INTO BOTH EYES 2 TIMES DAILY    Miscellaneous Opthalmic Allergy Drops Protocol Passed    4/3/2018  4:32 PM       Passed - Patient is age 4 or older       Passed - Recent (12 mo) or future (30 days) visit within the authorizing provider's specialty    Patient had office visit in the last 12 months or has a visit in the next 30 days with authorizing provider or within the authorizing provider's specialty.  See \"Patient Info\" tab in inbasket, or \"Choose Columns\" in Meds & Orders section of the refill encounter.              "

## 2018-05-20 ENCOUNTER — OFFICE VISIT (OUTPATIENT)
Dept: URGENT CARE | Facility: URGENT CARE | Age: 48
End: 2018-05-20
Payer: COMMERCIAL

## 2018-05-20 ENCOUNTER — RADIANT APPOINTMENT (OUTPATIENT)
Dept: GENERAL RADIOLOGY | Facility: CLINIC | Age: 48
End: 2018-05-20
Payer: COMMERCIAL

## 2018-05-20 VITALS
OXYGEN SATURATION: 95 % | SYSTOLIC BLOOD PRESSURE: 136 MMHG | DIASTOLIC BLOOD PRESSURE: 83 MMHG | BODY MASS INDEX: 43.6 KG/M2 | TEMPERATURE: 103.3 F | HEART RATE: 119 BPM | WEIGHT: 262 LBS

## 2018-05-20 DIAGNOSIS — E11.9 TYPE 2 DIABETES MELLITUS WITHOUT COMPLICATION, WITHOUT LONG-TERM CURRENT USE OF INSULIN (H): ICD-10-CM

## 2018-05-20 DIAGNOSIS — J45.909 UNCOMPLICATED ASTHMA, UNSPECIFIED ASTHMA SEVERITY, UNSPECIFIED WHETHER PERSISTENT: ICD-10-CM

## 2018-05-20 DIAGNOSIS — R06.02 SHORTNESS OF BREATH: ICD-10-CM

## 2018-05-20 DIAGNOSIS — J06.9 VIRAL URI: Primary | ICD-10-CM

## 2018-05-20 LAB
ANION GAP SERPL CALCULATED.3IONS-SCNC: 13 MMOL/L (ref 3–14)
BASOPHILS # BLD AUTO: 0 10E9/L (ref 0–0.2)
BASOPHILS NFR BLD AUTO: 0.2 %
BUN SERPL-MCNC: 8 MG/DL (ref 7–30)
CALCIUM SERPL-MCNC: 9.1 MG/DL (ref 8.5–10.1)
CHLORIDE SERPL-SCNC: 94 MMOL/L (ref 94–109)
CO2 SERPL-SCNC: 27 MMOL/L (ref 20–32)
CREAT SERPL-MCNC: 0.9 MG/DL (ref 0.66–1.25)
DIFFERENTIAL METHOD BLD: ABNORMAL
EOSINOPHIL # BLD AUTO: 0.1 10E9/L (ref 0–0.7)
EOSINOPHIL NFR BLD AUTO: 0.4 %
ERYTHROCYTE [DISTWIDTH] IN BLOOD BY AUTOMATED COUNT: 12.2 % (ref 10–15)
GFR SERPL CREATININE-BSD FRML MDRD: 90 ML/MIN/1.7M2
GLUCOSE SERPL-MCNC: 287 MG/DL (ref 70–99)
HCT VFR BLD AUTO: 44.3 % (ref 40–53)
HGB BLD-MCNC: 14.8 G/DL (ref 13.3–17.7)
LYMPHOCYTES # BLD AUTO: 1 10E9/L (ref 0.8–5.3)
LYMPHOCYTES NFR BLD AUTO: 4.6 %
MCH RBC QN AUTO: 29.6 PG (ref 26.5–33)
MCHC RBC AUTO-ENTMCNC: 33.4 G/DL (ref 31.5–36.5)
MCV RBC AUTO: 89 FL (ref 78–100)
MONOCYTES # BLD AUTO: 0.9 10E9/L (ref 0–1.3)
MONOCYTES NFR BLD AUTO: 4.1 %
NEUTROPHILS # BLD AUTO: 19 10E9/L (ref 1.6–8.3)
NEUTROPHILS NFR BLD AUTO: 90.7 %
PLATELET # BLD AUTO: 279 10E9/L (ref 150–450)
POTASSIUM SERPL-SCNC: 4.2 MMOL/L (ref 3.4–5.3)
RBC # BLD AUTO: 5 10E12/L (ref 4.4–5.9)
SODIUM SERPL-SCNC: 134 MMOL/L (ref 133–144)
WBC # BLD AUTO: 20.8 10E9/L (ref 4–11)

## 2018-05-20 PROCEDURE — 36415 COLL VENOUS BLD VENIPUNCTURE: CPT | Performed by: FAMILY MEDICINE

## 2018-05-20 PROCEDURE — 99214 OFFICE O/P EST MOD 30 MIN: CPT | Performed by: FAMILY MEDICINE

## 2018-05-20 PROCEDURE — 85027 COMPLETE CBC AUTOMATED: CPT | Performed by: FAMILY MEDICINE

## 2018-05-20 PROCEDURE — 80048 BASIC METABOLIC PNL TOTAL CA: CPT | Performed by: FAMILY MEDICINE

## 2018-05-20 PROCEDURE — 71046 X-RAY EXAM CHEST 2 VIEWS: CPT

## 2018-05-20 RX ORDER — METFORMIN HCL 500 MG
500 TABLET, EXTENDED RELEASE 24 HR ORAL 2 TIMES DAILY WITH MEALS
Qty: 60 TABLET | Refills: 1 | Status: SHIPPED | OUTPATIENT
Start: 2018-05-20 | End: 2018-07-09

## 2018-05-20 RX ORDER — ALBUTEROL SULFATE 0.83 MG/ML
1 SOLUTION RESPIRATORY (INHALATION) EVERY 4 HOURS PRN
Qty: 1 BOX | Refills: 0 | Status: ON HOLD | OUTPATIENT
Start: 2018-05-20 | End: 2021-04-17

## 2018-05-20 NOTE — PROGRESS NOTES
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  He states that things get cold very quickly.  Yesterday he did not have any symptoms including body aches or cough. Has not taken any medications such as ibuprofen or tylenol. No vomiting or diarrhea. No rash or sore throat. At this time patient does NOT feel short of breath. States he had a very brief moment of feeling short of breath (without pain) earlier when he had the 'chills'. In regards to lower extremity swelling his wife states this fluctuates and has been going on for several years.     Patient does not have a primary care provider states that in the past he had an elevated blood sugar that may need follow-up for diabetes screening.  He does have accunebs at home but is not a controller for his reported asthma.     Patient is accompanied by his wife favio  PMHX/PSHX/MEDS/ALLERGIES/SHX/FHX reviewed and updated in Epic.    Patient Active Problem List    Diagnosis Date Noted     Diabetes mellitus (H) 05/20/2018     Priority: Medium     CARDIOVASCULAR SCREENING; LDL GOAL LESS THAN 130 04/21/2017     Priority: Medium     Chronic midline low back pain without sciatica      Priority: Medium     Environmental allergies      Priority: Medium     Morbid obesity (H) 01/11/2017     Priority: Medium     ddd lumbar secondary to Body mass index is 40.62 kg/(m^2).         DDD (degenerative disc disease), lumbar 01/11/2017     Priority: Medium       Current Outpatient Prescriptions   Medication     albuterol (2.5 MG/3ML) 0.083% neb solution     albuterol (2.5 MG/3ML) 0.083% neb solution     albuterol (PROAIR HFA/PROVENTIL HFA/VENTOLIN  HFA) 108 (90 BASE) MCG/ACT Inhaler     CVS EYE ITCH RELIEF 0.025 % SOLN ophthalmic solution     fluticasone (FLONASE) 50 MCG/ACT spray     fluticasone (FLONASE) 50 MCG/ACT spray     fluticasone (FLONASE) 50 MCG/ACT spray     lidocaine (LIDODERM) 5 % Patch     loratadine-pseudoePHEDrine (CLARITIN-D 24-HOUR)  MG per 24 hr tablet     loratadine-pseudoePHEDrine (CVS LORATADINE-D 24 HOUR)  MG per 24 hr tablet     metFORMIN (GLUCOPHAGE-XR) 500 MG 24 hr tablet     montelukast (SINGULAIR) 10 MG tablet     montelukast (SINGULAIR) 10 MG tablet     VENTOLIN  (90 BASE) MCG/ACT Inhaler     [DISCONTINUED] albuterol (2.5 MG/3ML) 0.083% neb solution     No current facility-administered medications for this visit.      ROS:  As above per HPI    Objective:   /83 (Cuff Size: Adult Large)  Pulse 119  Temp 103.3  F (39.6  C) (Tympanic)  Wt 262 lb (118.8 kg)  SpO2 95%  BMI 43.6 kg/m2, Body mass index is 43.6 kg/(m^2).  Gen:  NAD, well-nourished, sitting in wheelchair  HEENT: EOMI, PERRL sclera anicteric, Head normocephalic, ; nares patent; oropharynx pink and moist  Neck: trachea midline  CV:  RRR  - no murmurs, rubs, or gallups,   Pulm:  CTAB, no wheezes/rales/rhonchi, no increased work of breathing   ABD: obese, soft, non-distended  Extrem: stasis dermatitis of left lower lateral extremity, 1+ edema up to mid lower leg bilaterally (reports as chronic)  Skin: no obvious rashes or abnormalities  Psych: Euthymic, linear thoughts, normal rate of speech  Gait: straight, moderate pace, no antalgia      Results for orders placed or performed in visit on 05/20/18   CBC with platelets   Result Value Ref Range    WBC 20.8 (H) 4.0 - 11.0 10e9/L    RBC Count 5.00 4.4 - 5.9 10e12/L    Hemoglobin 14.8 13.3 - 17.7 g/dL    Hematocrit 44.3 40.0 - 53.0 %    MCV 89 78 - 100 fl    MCH 29.6 26.5 - 33.0 pg    MCHC 33.4 31.5 - 36.5 g/dL    RDW 12.2 10.0 - 15.0 %    Platelet Count 279 150 - 450 10e9/L   Basic metabolic panel   (Ca, Cl, CO2, Creat, Gluc, K, Na, BUN)   Result Value Ref Range    Sodium 134 133 - 144 mmol/L    Potassium 4.2 3.4 - 5.3 mmol/L    Chloride 94 94 - 109 mmol/L    Carbon Dioxide 27 20 - 32 mmol/L    Anion Gap 13 3 - 14 mmol/L    Glucose 287 (H) 70 - 99 mg/dL    Urea Nitrogen 8 7 - 30 mg/dL    Creatinine 0.90 0.66 - 1.25 mg/dL    GFR Estimate 90 >60 mL/min/1.7m2    GFR Estimate If Black >90 >60 mL/min/1.7m2    Calcium 9.1 8.5 - 10.1 mg/dL   WBC Differential   Result Value Ref Range    Diff Method Automated Method     % Neutrophils 90.7 %    % Lymphocytes 4.6 %    % Monocytes 4.1 %    % Eosinophils 0.4 %    % Basophils 0.2 %    Absolute Neutrophil 19.0 (H) 1.6 - 8.3 10e9/L    Absolute Lymphocytes 1.0 0.8 - 5.3 10e9/L    Absolute Monocytes 0.9 0.0 - 1.3 10e9/L    Absolute Eosinophils 0.1 0.0 - 0.7 10e9/L    Absolute Basophils 0.0 0.0 - 0.2 10e9/L       Assessment & Plan:   Martir Barrientos, 48 year old male who presents with:    Viral URI  Suspect early symptoms of a viral illness, Left shift seen on CBC. Normal 2 view chest Xray rules out pneumonia at this time. Patient felt much better after getting 650mg of tylenol. Recommend continued supportive treatment, close follow-up if no improvement.   - XR Chest 2 Views  - CBC with platelets  - Basic metabolic panel  (Ca, Cl, CO2, Creat, Gluc, K, Na, BUN)    Uncomplicated asthma, unspecified asthma severity, unspecified whether persistent  Refill of albuterol provided  - albuterol (2.5 MG/3ML) 0.083% neb solution  Dispense: 1 Box; Refill: 0    Diabetes, Type 2:  Random glucose > 200,by my guess A1c  in the high 8's to 9's.. This is his second elevated random glucose on file. I've discussed that he needs to establish care with PCP to continue management of his diabetes.   We'll start on Metformin 500mg XR today to begin the titration, 1000mg XR a day in 1 week if tolerating medication. Intact kidney function.     John Osman MD PGY3  [phone] (Pager)  [facility]    Options for treatment and/or follow-up care were reviewed with the patient. Martir Barrientos and/or legal guardian was engaged and actively involved in the decision making process. Patient/guardian verbalized understanding of the options discussed and was satisfied with the final plan.

## 2018-05-20 NOTE — PATIENT INSTRUCTIONS
Take tylenol as needed for fevers. Stay well hydrated at least 6-8 glasses of water a day.     Make an appointment to establish care with a primary care provider.  They can discuss further testing for your diabetes.       1 pill of metformin (500mg)  once a day for 1 week  Starting next week take 2 pills a day (1000mg).

## 2018-05-20 NOTE — MR AVS SNAPSHOT
"              After Visit Summary   5/20/2018    Martir Barrientos    MRN: [id number]           Patient Information     Date Of Birth          1970        Visit Information        Provider Department      5/20/2018 6:35 PM Jhon Osman MD [facility]        Today's Diagnoses     Viral URI    -  1    Uncomplicated asthma, unspecified asthma severity, unspecified whether persistent        Type 2 diabetes mellitus without complication, without long-term current use of insulin (H)          Care Instructions    Take tylenol as needed for fevers. Stay well hydrated at least 6-8 glasses of water a day.     Make an appointment to establish care with a primary care provider.  They can discuss further testing for your diabetes.       1 pill of metformin (500mg)  once a day for 1 week  Starting next week take 2 pills a day (1000mg).           Follow-ups after your visit        Who to contact     If you have questions or need follow up information about today's clinic visit or your schedule please contact [facility] directly at [phone].  Normal or non-critical lab and imaging results will be communicated to you by [employer], letter or phone within 4 business days after the clinic has received the results. If you do not hear from us within 7 days, please contact the clinic through Ling[employer]ub[employer] or phone. If you have a critical or abnormal lab result, we will notify you by phone as soon as possible.  Submit refill requests through [employer] or call your pharmacy and they will forward the refill request to us. Please allow 3 business days for your refill to be completed.          Additional Information About Your Visit        [employer] Information     Weizoom lets you send messages to your doctor, view your test results, renew your prescriptions, schedule appointments and more. To sign up, go to [email or web address] . Click on \"Log in\" on the left side of the screen, which will take " "you to the Welcome page. Then click on \"Sign up Now\" on the right side of the page.     You will be asked to enter the access code listed below, as well as some personal information. Please follow the directions to create your username and password.     Your access code is: [email or web address]  Expires: 2018  7:50 PM     Your access code will  in 90 days. If you need help or a new code, please call your [facility] clinic or [phone].        Care EveryWhere ID     This is your Care EveryWhere ID. This could be used by other organizations to access your [facility] medical records  [id number]        Your Vitals Were     Pulse Temperature Pulse Oximetry BMI (Body Mass Index)          119 103.3  F (39.6  C) (Tympanic) 95% 43.6 kg/m2         Blood Pressure from Last 3 Encounters:   18 136/83   18 131/75   17 122/76    Weight from Last 3 Encounters:   18 262 lb (118.8 kg)   18 262 lb (118.8 kg)   17 262 lb 12.8 oz (119.2 kg)              We Performed the Following     Basic metabolic panel  (Ca, Cl, CO2, Creat, Gluc, K, Na, BUN)     CBC with platelets     WBC Differential          Today's Medication Changes          These changes are accurate as of 18  7:50 PM.  If you have any questions, ask your nurse or doctor.               Start taking these medicines.        Dose/Directions    metFORMIN 500 MG 24 hr tablet   Commonly known as:  GLUCOPHAGE-XR   Used for:  Type 2 diabetes mellitus without complication, without long-term current use of insulin (H)   Started by:  Jhon Osman MD        Dose:  500 mg   Take 1 tablet (500 mg) by mouth 2 times daily (with meals)   Quantity:  60 tablet   Refills:  1            Where to get your medicines      These medications were sent to [facility] #[id number] - [place], [place] - [address]  [address] [facility] [facility] [address]     Phone:  [phone]     albuterol (2.5 MG/3ML) 0.083% neb solution    metFORMIN 500 MG 24 hr " tablet                Primary Care Provider Fax #    Physician No Ref-Primary [phone]       No address on file        Equal Access to Services     ELIUD HOOK : Hadii tyson lin em [place], waraizada lusen, daljit karuperto bass, jose alejandro moreaujesica de la torre. So [facility] [phone].    ATENCIÓN: Si habla español, tiene a yoon disposición servicios gratuitos de asistencia lingüística. Llame al [phone].    We comply with applicable federal civil rights laws and [place] laws. We do not discriminate on the basis of race, color, national origin, age, disability, sex, sexual orientation, or gender identity.            Thank you!     Thank you for choosing [facility]  for your care. Our goal is always to provide you with excellent care. Hearing back from our patients is one way we can continue to improve our services. Please take a few minutes to complete the written survey that you may receive in the mail after your visit with us. Thank you!             Your Updated Medication List - Protect others around you: Learn how to safely use, store and throw away your medicines at www.disposemymeds.org.          This list is accurate as of 5/20/18  7:50 PM.  Always use your most recent med list.                   Brand Name Dispense Instructions for use Diagnosis    * albuterol (2.5 MG/3ML) 0.083% neb solution     360 mL    Take 1 vial (2.5 mg) by nebulization every 4 hours as needed for shortness of breath / dyspnea or wheezing    Environmental allergies, Seasonal allergic rhinitis, unspecified allergic rhinitis trigger, Chronic allergic conjunctivitis       * VENTOLIN  (90 Base) MCG/ACT Inhaler   Generic drug:  albuterol      INHALE 2 PUFFS EVERY 4HRS AS NEEDED        * albuterol 108 (90 Base) MCG/ACT Inhaler    PROAIR HFA/PROVENTIL HFA/VENTOLIN HFA    1 Inhaler    Inhale 2 puffs into the lungs every 4 hours as needed for shortness of breath / dyspnea or wheezing     Exacerbation of persistent asthma, unspecified asthma severity       * albuterol (2.5 MG/3ML) 0.083% neb solution     1 Box    Take 1 vial (2.5 mg) by nebulization every 4 hours as needed for shortness of breath / dyspnea or wheezing    Uncomplicated asthma, unspecified asthma severity, unspecified whether persistent       [employer] EYE ITCH RELIEF 0.025 % Soln ophthalmic solution   Generic drug:  ketotifen     5 mL    PLACE 2 DROPS INTO BOTH EYES 2 TIMES DAILY    Chronic allergic conjunctivitis       * fluticasone 50 MCG/ACT spray    FLONASE    16 mL    SPRAY 2 SPRAYS INTO BOTH NOSTRILS DAILY    Seasonal allergic rhinitis, Environmental allergies       * fluticasone 50 MCG/ACT spray    FLONASE    1 Bottle    Spray 1-2 sprays into both nostrils daily    Chronic seasonal allergic rhinitis, unspecified trigger       * fluticasone 50 MCG/ACT spray    FLONASE    1 Bottle    Spray 2 sprays into both nostrils daily    Exacerbation of persistent asthma, unspecified asthma severity       lidocaine 5 % Patch    LIDODERM    30 patch    PA NEEDED. APPLY UP TO 3 PATCHES TO PAINFUL AREA AT ONCE FOR UP TO 12 HR WITHIN A 24 HR PERIOD    Chronic midline low back pain without sciatica       * loratadine-pseudoePHEDrine  MG per 24 hr tablet    [facility] LORATADINE-D 24 HOUR    10 tablet    Take 1 tablet by mouth daily NEEDS TO SEE MD    Chronic seasonal allergic rhinitis, unspecified trigger       * loratadine-pseudoePHEDrine  MG per 24 hr tablet    CLARITIN-D 24-hour    30 tablet    Take 1 tablet by mouth daily    Exacerbation of persistent asthma, unspecified asthma severity       metFORMIN 500 MG 24 hr tablet    GLUCOPHAGE-XR    60 tablet    Take 1 tablet (500 mg) by mouth 2 times daily (with meals)    Type 2 diabetes mellitus without complication, without long-term current use of insulin (H)       * montelukast 10 MG tablet    SINGULAIR    30 tablet    Take 1 tablet (10 mg) by mouth At Bedtime LAST REFILL SEE MD    Environmental  allergies, Chronic seasonal allergic rhinitis, unspecified trigger       * montelukast 10 MG tablet    SINGULAIR    30 tablet    Take 1 tablet (10 mg) by mouth At Bedtime    Exacerbation of persistent asthma, unspecified asthma severity       * Notice:  This list has 11 medication(s) that are the same as other medications prescribed for you. Read the directions carefully, and ask your doctor or other care provider to review them with you.

## 2018-07-09 ENCOUNTER — HOSPITAL ENCOUNTER (EMERGENCY)
Facility: CLINIC | Age: 48
Discharge: HOME | End: 2018-07-09
Attending: EMERGENCY MEDICINE | Admitting: EMERGENCY MEDICINE

## 2018-07-09 ENCOUNTER — APPOINTMENT (OUTPATIENT)
Dept: GENERAL RADIOLOGY | Facility: CLINIC | Age: 48
End: 2018-07-09
Attending: EMERGENCY MEDICINE

## 2018-07-09 ENCOUNTER — APPOINTMENT (OUTPATIENT)
Dept: CT IMAGING | Facility: CLINIC | Age: 48
End: 2018-07-09
Attending: EMERGENCY MEDICINE

## 2018-07-09 VITALS — TEMPERATURE: 98.8 F | OXYGEN SATURATION: 95 % | SYSTOLIC BLOOD PRESSURE: 118 MMHG | DIASTOLIC BLOOD PRESSURE: 65 MMHG
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
## 2018-07-09 DIAGNOSIS — S13.9XXA NECK SPRAIN, INITIAL ENCOUNTER: ICD-10-CM

## 2018-07-09 DIAGNOSIS — M54.6 ACUTE BILATERAL THORACIC BACK PAIN: ICD-10-CM

## 2018-07-09 PROCEDURE — 99284 EMERGENCY DEPT VISIT MOD MDM: CPT | Mod: 25

## 2018-07-09 PROCEDURE — 71046 X-RAY EXAM CHEST 2 VIEWS: CPT

## 2018-07-09 PROCEDURE — 25000132 ZZH RX MED GY IP 250 OP 250 PS 637: Performed by: EMERGENCY MEDICINE

## 2018-07-09 PROCEDURE — 72125 CT NECK SPINE W/O DYE: CPT

## 2018-07-09 PROCEDURE — 72072 X-RAY EXAM THORAC SPINE 3VWS: CPT

## 2018-07-09 RX ORDER — ACETAMINOPHEN 325 MG/1
975 TABLET ORAL ONCE
Status: COMPLETED | OUTPATIENT
Start: 2018-07-09 | End: 2018-07-09

## 2018-07-09 RX ORDER — LIDOCAINE 4 G/G
1 PATCH TOPICAL ONCE
Status: DISCONTINUED | OUTPATIENT
Start: 2018-07-09 | End: 2018-07-09 | Stop reason: HOSPADM

## 2018-07-09 RX ORDER — LIDOCAINE 50 MG/G
1 PATCH TOPICAL EVERY 24 HOURS
Qty: 10 PATCH | Refills: 0 | Status: SHIPPED | OUTPATIENT
Start: 2018-07-09 | End: 2018-07-19

## 2018-07-09 RX ADMIN — ACETAMINOPHEN 975 MG: 325 TABLET, FILM COATED ORAL at 21:01

## 2018-07-09 RX ADMIN — LIDOCAINE 1 PATCH: 560 PATCH PERCUTANEOUS; TOPICAL; TRANSDERMAL at 21:01

## 2018-07-09 ASSESSMENT — ENCOUNTER SYMPTOMS
NAUSEA: 0
NECK PAIN: 1
WEAKNESS: 0
ABDOMINAL PAIN: 0
VOMITING: 0
HEADACHES: 0
NUMBNESS: 0
SHORTNESS OF BREATH: 0
WOUND: 0
BACK PAIN: 1
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
## 2018-07-09 NOTE — ED AVS SNAPSHOT
[facility] Emergency Department    [address]    [facility] [address]    Phone:  [phone]    Fax:  [phone]                                       Martir Barrientos   MRN: [id number]    Department:  [facility] Emergency Department   Date of Visit:  7/9/2018           After Visit Summary Signature Page     I have received my discharge instructions, and my questions have been answered. I have discussed any challenges I see with this plan with the nurse or doctor.    ..........................................................................................................................................  Patient/Patient Representative Signature      ..........................................................................................................................................  Patient Representative Print Name and Relationship to Patient    ..................................................               ................................................  Date                                            Time    ..........................................................................................................................................  Reviewed by Signature/Title    ...................................................              ..............................................  Date                                                            Time
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
## 2018-07-10 NOTE — DISCHARGE INSTRUCTIONS
Please use tylenol and ibuprofen for pain.  Drink plenty of fluids.  Follow up with your PCP in 1-2 days for reassessment. Return to the ED if worsening shortness of breath, cough, fevers not responding to tylenol/ibuprofen, severe headache or neck pain or other acute concerns.      Discharge Instructions  Neck Strain    You have been seen today for a neck sprain or strain.  Neck strains usually result from an injury to the neck. Car accidents, contact sports, and falls are common causes of neck strain. Sometimes your neck can start to hurt because of increased activity, muscle tension, an abnormal sleeping position, or because of other problems like arthritis in the neck.     Neck pain usually comes from injured muscles and ligaments. Sometimes there is a herniated ( slipped ) disc. We do not usually do MRI scans to look for these right away, since most herniated discs will get better on their own with time. Today, we did not find any evidence that your neck pain was caused by a serious or dangerous condition. However, sometimes symptoms develop over time and cannot be found during an emergency visit, so it is very important that you follow up with your primary provider.    Generally, every Emergency Department visit should have a follow-up clinic visit with either a primary or a specialty clinic/provider. Please follow-up as instructed by your emergency provider today.    Return to the Emergency Department if:    You have increasing pain in your neck.    You develop difficulty swallowing or breathing.    You have numbness, weakness, or trouble moving your arms or legs.    You have severe dizziness and difficulty walking.    You are unable to control your bladder or bowels.    You develop severe headache or ringing in the ears.    What can I do to help myself at home?    If you had an injury, use cold for the first 1-2 days. Cold helps relieve pain and reduce inflammation.  Apply ice packs to the neck or areas of  pain every 1-2 hours for 20 minutes at a time. Place a towel or cloth between your skin and the ice pack.    After the first 2 days, using heat can help with neck pain and stiffness. You may use a warm shower or bath, warm towels on the neck, or a heating pad. Do not sleep with a heating pad, as you can be burned.     Pain medications - You may take a pain medication such as Tylenol  (acetaminophen), Advil  and Motrin  (ibuprofen), or Aleve  (naproxen).    It is usually best to rest the neck for 1-2 days after an injury, then start gentle stretching exercises.     It is helpful to place a small pillow under the nape of your neck to provide proper neutral positioning.     You should stay active and do your usual work as much as you can, unless this involves heavy physical labor. Ask your provider if you need work restrictions.  If you were given a prescription for medicine here today, be sure to read all of the information (including the package insert) that comes with your prescription.  This will include important information about the medicine, its side effects, and any warnings that you need to know about.  The pharmacist who fills the prescription can provide more information and answer questions you may have about the medicine.  If you have questions or concerns that the pharmacist cannot address, please call or return to the Emergency Department.   Remember that you can always come back to the Emergency Department if you are not able to see your regular provider in the amount of time listed above, if you get any new symptoms, or if there is anything that worries you.    Discharge Instructions  Back Pain  You were seen today for back pain. Back pain can have many causes, but most will get better without surgery or other specific treatment. Sometimes there is a herniated ( slipped ) disc. We do not usually do MRI scans to look for these right away, since most herniated discs will get better on their own with  time.  Today, we did not find any evidence that your back pain was caused by a serious condition. However, sometimes symptoms develop over time and cannot be found during an emergency visit, so it is very important that you follow up with your primary provider.  Generally, every Emergency Department visit should have a follow-up clinic visit with either a primary or a specialty clinic/provider. Please follow-up as instructed by your emergency provider today.    Return to the Emergency Department if:    You develop a fever with your back pain.     You have weakness or change in sensation in one or both legs.    You lose control of your bowels or bladder, or cannot empty your bladder (cannot pee).    Your pain gets much worse.     Follow-up with your provider:    Unless your pain has completely gone away, please make an appointment with your provider within one week. Most of the routine care for back pain is available in a clinic and not the Emergency Department. You may need further management of your back pain, such as more pain medication, imaging such as an X-ray or MRI, or physical therapy.    What can I do to help myself?    Remain Active -- People are often afraid that they will hurt their back further or delay recovery by remaining active, but this is one of the best things you can do for your back. In fact, staying in bed for a long time to rest is not recommended. Studies have shown that people with low back pain recover faster when they remain active. Movement helps to bring blood flow to the muscles and relieve muscle spasms as well as preventing loss of muscle strength.    Heat -- Using a heating pad can help with low back pain during the first few weeks. Do not sleep with a heating pad, as you can be burned.     Pain medications - You may take a pain medication such as Tylenol  (acetaminophen), Advil , Motrin  (ibuprofen) or Aleve  (naproxen).  If you were given a prescription for medicine here today, be  sure to read all of the information (including the package insert) that comes with your prescription.  This will include important information about the medicine, its side effects, and any warnings that you need to know about.  The pharmacist who fills the prescription can provide more information and answer questions you may have about the medicine.  If you have questions or concerns that the pharmacist cannot address, please call or return to the Emergency Department.   Remember that you can always come back to the Emergency Department if you are not able to see your regular provider in the amount of time listed above, if you get any new symptoms, or if there is anything that worries you.
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
## 2018-07-12 ENCOUNTER — HOSPITAL ENCOUNTER (EMERGENCY)
Facility: CLINIC | Age: 48
Discharge: HOME | End: 2018-07-12
Admitting: NURSE PRACTITIONER
Payer: COMMERCIAL

## 2018-07-12 ENCOUNTER — APPOINTMENT (OUTPATIENT)
Dept: GENERAL RADIOLOGY | Facility: CLINIC | Age: 48
End: 2018-07-12
Attending: NURSE PRACTITIONER
Payer: COMMERCIAL

## 2018-07-12 VITALS
SYSTOLIC BLOOD PRESSURE: 124 MMHG | OXYGEN SATURATION: 97 % | TEMPERATURE: 97.5 F | DIASTOLIC BLOOD PRESSURE: 75 MMHG | WEIGHT: 240 LBS | HEART RATE: 61 BPM | RESPIRATION RATE: 20 BRPM | BODY MASS INDEX: 39.94 KG/M2

## 2018-07-12 DIAGNOSIS — M54.2 NECK PAIN ON RIGHT SIDE: ICD-10-CM

## 2018-07-12 DIAGNOSIS — M25.511 ACUTE PAIN OF RIGHT SHOULDER: ICD-10-CM
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
## 2018-07-12 PROCEDURE — 99283 EMERGENCY DEPT VISIT LOW MDM: CPT

## 2018-07-12 PROCEDURE — 73030 X-RAY EXAM OF SHOULDER: CPT | Mod: RT

## 2018-07-12 PROCEDURE — 25000132 ZZH RX MED GY IP 250 OP 250 PS 637: Performed by: NURSE PRACTITIONER

## 2018-07-12 RX ORDER — LIDOCAINE 4 G/G
2 PATCH TOPICAL ONCE
Status: DISCONTINUED | OUTPATIENT
Start: 2018-07-12 | End: 2018-07-12 | Stop reason: HOSPADM

## 2018-07-12 RX ORDER — METHOCARBAMOL 500 MG/1
1000 TABLET, FILM COATED ORAL 3 TIMES DAILY PRN
Qty: 12 TABLET | Refills: 0 | Status: SHIPPED | OUTPATIENT
Start: 2018-07-12 | End: 2018-07-12

## 2018-07-12 RX ORDER — ACETAMINOPHEN 325 MG/1
650 TABLET ORAL ONCE
Status: COMPLETED | OUTPATIENT
Start: 2018-07-12 | End: 2018-07-12

## 2018-07-12 RX ORDER — CYCLOBENZAPRINE HCL 10 MG
5 TABLET ORAL 3 TIMES DAILY PRN
Qty: 12 TABLET | Refills: 0 | Status: SHIPPED | OUTPATIENT
Start: 2018-07-12

## 2018-07-12 RX ADMIN — ACETAMINOPHEN 650 MG: 325 TABLET, FILM COATED ORAL at 12:11

## 2018-07-12 ASSESSMENT — ENCOUNTER SYMPTOMS
ARTHRALGIAS: 1
WEAKNESS: 0
BACK PAIN: 1
SHORTNESS OF BREATH: 0
FEVER: 0
MYALGIAS: 1
NECK PAIN: 1
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
## 2018-07-12 NOTE — DISCHARGE INSTRUCTIONS
Acetaminophen/heat/ice for pain. Robaxin as needed for severe pain- no alcohol/driving/working while taking robaxin. Follow-up with primary MD if still having significant issues Monday. Return to ED with shortness of breath, weakness in arms or legs, or any further concerns.        Discharge Instructions  Neck Strain    You have been seen today for a neck sprain or strain.  Neck strains usually result from an injury to the neck. Car accidents, contact sports, and falls are common causes of neck strain. Sometimes your neck can start to hurt because of increased activity, muscle tension, an abnormal sleeping position, or because of other problems like arthritis in the neck.     Neck pain usually comes from injured muscles and ligaments. Sometimes there is a herniated ( slipped ) disc. We do not usually do MRI scans to look for these right away, since most herniated discs will get better on their own with time. Today, we did not find any evidence that your neck pain was caused by a serious or dangerous condition. However, sometimes symptoms develop over time and cannot be found during an emergency visit, so it is very important that you follow up with your primary provider.    Generally, every Emergency Department visit should have a follow-up clinic visit with either a primary or a specialty clinic/provider. Please follow-up as instructed by your emergency provider today.    Return to the Emergency Department if:    You have increasing pain in your neck.    You develop difficulty swallowing or breathing.    You have numbness, weakness, or trouble moving your arms or legs.    You have severe dizziness and difficulty walking.    You are unable to control your bladder or bowels.    You develop severe headache or ringing in the ears.    What can I do to help myself at home?    If you had an injury, use cold for the first 1-2 days. Cold helps relieve pain and reduce inflammation.  Apply ice packs to the neck or areas of  pain every 1-2 hours for 20 minutes at a time. Place a towel or cloth between your skin and the ice pack.    After the first 2 days, using heat can help with neck pain and stiffness. You may use a warm shower or bath, warm towels on the neck, or a heating pad. Do not sleep with a heating pad, as you can be burned.     Pain medications - You may take a pain medication such as Tylenol  (acetaminophen), Advil  and Motrin  (ibuprofen), or Aleve  (naproxen).    It is usually best to rest the neck for 1-2 days after an injury, then start gentle stretching exercises.     It is helpful to place a small pillow under the nape of your neck to provide proper neutral positioning.     You should stay active and do your usual work as much as you can, unless this involves heavy physical labor. Ask your provider if you need work restrictions.  If you were given a prescription for medicine here today, be sure to read all of the information (including the package insert) that comes with your prescription.  This will include important information about the medicine, its side effects, and any warnings that you need to know about.  The pharmacist who fills the prescription can provide more information and answer questions you may have about the medicine.  If you have questions or concerns that the pharmacist cannot address, please call or return to the Emergency Department.   Remember that you can always come back to the Emergency Department if you are not able to see your regular provider in the amount of time listed above, if you get any new symptoms, or if there is anything that worries you.
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
## 2018-07-12 NOTE — ED AVS SNAPSHOT
[facility]    [address]    [facility] [address]    Phone:  [phone]    Fax:  [phone]                                       Martir Barrientos   MRN: [id number]    Department:  [facility]   Date of Visit:  7/12/2018           After Visit Summary Signature Page     I have received my discharge instructions, and my questions have been answered. I have discussed any challenges I see with this plan with the nurse or doctor.    ..........................................................................................................................................  Patient/Patient Representative Signature      ..........................................................................................................................................  Patient Representative Print Name and Relationship to Patient    ..................................................               ................................................  Date                                            Time    ..........................................................................................................................................  Reviewed by Signature/Title    ...................................................              ..............................................  Date                                                            Time
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
## 2018-07-24 RX ORDER — METFORMIN HCL 500 MG
1 TABLET, EXTENDED RELEASE 24 HR ORAL 2 TIMES DAILY
Refills: 1 | Status: ON HOLD | COMMUNITY
Start: 2018-06-18 | End: 2021-04-17

## 2018-07-25 RX ORDER — METFORMIN HCL 500 MG
500 TABLET, EXTENDED RELEASE 24 HR ORAL 2 TIMES DAILY
Qty: 30 TABLET | Refills: 1 | OUTPATIENT
Start: 2018-07-25

## 2018-10-17 ENCOUNTER — OFFICE VISIT (OUTPATIENT)
Dept: URGENT CARE | Facility: URGENT CARE | Age: 48
End: 2018-10-17
Payer: COMMERCIAL

## 2018-10-17 VITALS
HEART RATE: 80 BPM | OXYGEN SATURATION: 96 % | SYSTOLIC BLOOD PRESSURE: 122 MMHG | TEMPERATURE: 97.1 F | DIASTOLIC BLOOD PRESSURE: 60 MMHG

## 2018-10-17 DIAGNOSIS — M79.641 PAIN OF RIGHT HAND: Primary | ICD-10-CM

## 2018-10-17 PROCEDURE — 99213 OFFICE O/P EST LOW 20 MIN: CPT | Performed by: PHYSICIAN ASSISTANT

## 2018-10-17 NOTE — PROGRESS NOTES
SUBJECTIVE:  Chief Complaint   Patient presents with     Urgent Care     Finger     right hand pain x 2 weeks, when using hand, locks up and puffs up.  tendonitis on right shoulder      Martir Barrientos is a 48 year old male who presents with a chief complaint of right hand pain and swelling and locking of all fingers.  Episodes occurring 1-3 times per day.  Unsure of triggering activities. Symptoms began 3 week(s) ago, are moderate and worsening  Context:  Injury:No.  Episodes maybe a minute or two    Welcontrolled T2DM per patient    Past Medical History:   Diagnosis Date     CARDIOVASCULAR SCREENING; LDL GOAL LESS THAN 130      Chronic midline low back pain without sciatica      Environmental allergies      Uncomplicated asthma      Current Outpatient Prescriptions   Medication Sig Dispense Refill     albuterol (2.5 MG/3ML) 0.083% neb solution Take 1 vial (2.5 mg) by nebulization every 4 hours as needed for shortness of breath / dyspnea or wheezing 1 Box 0     albuterol (2.5 MG/3ML) 0.083% neb solution Take 1 vial (2.5 mg) by nebulization every 4 hours as needed for shortness of breath / dyspnea or wheezing 360 mL 1     albuterol (PROAIR HFA/PROVENTIL HFA/VENTOLIN HFA) 108 (90 BASE) MCG/ACT Inhaler Inhale 2 puffs into the lungs every 4 hours as needed for shortness of breath / dyspnea or wheezing 1 Inhaler 0     [employer] EYE ITCH RELIEF 0.025 % SOLN ophthalmic solution PLACE 2 DROPS INTO BOTH EYES 2 TIMES DAILY 5 mL 0     cyclobenzaprine (FLEXERIL) 10 MG tablet Take 0.5 tablets (5 mg) by mouth 3 times daily as needed for muscle spasms 12 tablet 0     fluticasone (FLONASE) 50 MCG/ACT spray Spray 2 sprays into both nostrils daily 1 Bottle 0     fluticasone (FLONASE) 50 MCG/ACT spray SPRAY 2 SPRAYS INTO BOTH NOSTRILS DAILY 16 mL 1     loratadine-pseudoePHEDrine (CLARITIN-D 24-HOUR)  MG per 24 hr tablet Take 1 tablet by mouth daily 30 tablet 0     loratadine-pseudoePHEDrine ([employer]S LORATADINE-D 24 HOUR)  MG  per 24 hr tablet Take 1 tablet by mouth daily NEEDS TO SEE MD 10 tablet 0     metFORMIN (GLUCOPHAGE-XR) 500 MG 24 hr tablet Take 1 tablet by mouth 2 times daily  1     montelukast (SINGULAIR) 10 MG tablet Take 1 tablet (10 mg) by mouth At Bedtime 30 tablet 0     montelukast (SINGULAIR) 10 MG tablet Take 1 tablet (10 mg) by mouth At Bedtime LAST REFILL SEE MD 30 tablet 0     VENTOLIN  (90 BASE) MCG/ACT Inhaler INHALE 2 PUFFS EVERY 4HRS AS NEEDED  1     fluticasone (FLONASE) 50 MCG/ACT spray Spray 1-2 sprays into both nostrils daily (Patient not taking: Reported on 1/16/2018) 1 Bottle 11     lidocaine (LIDODERM) 5 % Patch PA NEEDED. APPLY UP TO 3 PATCHES TO PAINFUL AREA AT ONCE FOR UP TO 12 HR WITHIN A 24 HR PERIOD (Patient not taking: Reported on 1/16/2018) 30 patch 1     Social History   Substance Use Topics     Smoking status: Former Smoker     Smokeless tobacco: Never Used     Alcohol use No       ROS:  Review of systems negative except as stated below    EXAM:   /60 (BP Location: Right arm, Patient Position: Chair, Cuff Size: Adult Large)  Pulse 80  Temp 97.1  F (36.2  C) (Tympanic)  SpO2 96%  HAND: no tenderness, nodular texture, swelling, or rigidity.    GENERAL APPEARANCE: healthy, alert and no distress  CHEST: clear to auscultation  CV: regular rate and rhythm  EXTREMITIES: peripheral pulses normal  MS: no gross deformities noted, no evidence of inflammation in joints, FROM in all extremities.  SKIN: no suspicious lesions or rashes  NEURO: Normal strength and tone, sensory exam grossly normal, mentation intact and speech normal    X-RAY was not done.    ASSESSMENT:  (M79.641) Pain of right hand  (primary encounter diagnosis)  Plan: Follow up with PCP if symptoms persist.  Return if symptoms worsen

## 2018-10-17 NOTE — MR AVS SNAPSHOT
"              After Visit Summary   10/17/2018    Martir Barrientos    MRN: [id number]           Patient Information     Date Of Birth          1970        Visit Information        Provider Department      10/17/2018 2:00 PM Bassam Ramon PA-C [facility]        Today's Diagnoses     Pain of right hand    -  1       Follow-ups after your visit        Who to contact     If you have questions or need follow up information about today's clinic visit or your schedule please contact [facility] directly at [phone].  Normal or non-critical lab and imaging results will be communicated to you by [employer], letter or phone within 4 business days after the clinic has received the results. If you do not hear from us within 7 days, please contact the clinic through [employer] or phone. If you have a critical or abnormal lab result, we will notify you by phone as soon as possible.  Submit refill requests through [employer] or call your pharmacy and they will forward the refill request to us. Please allow 3 business days for your refill to be completed.          Additional Information About Your Visit        MyChart Information     [employer] lets you send messages to your doctor, view your test results, renew your prescriptions, schedule appointments and more. To sign up, go to [email or web address] . Click on \"Log in\" on the left side of the screen, which will take you to the Welcome page. Then click on \"Sign up Now\" on the right side of the page.     You will be asked to enter the access code listed below, as well as some personal information. Please follow the directions to create your username and password.     Your access code is: [email or web address]  Expires: 1/15/2019  6:59 PM     Your access code will  in 90 days. If you need help or a new code, please call your [place] clinic or [phone].        Care EveryWhere ID     This is your Care EveryWhere ID. This could be " used by other organizations to access your [facility] medical records  [id number]        Your Vitals Were     Pulse Temperature Pulse Oximetry             80 97.1  F (36.2  C) (Tympanic) 96%          Blood Pressure from Last 3 Encounters:   10/17/18 122/60   07/12/18 124/75   07/09/18 118/65    Weight from Last 3 Encounters:   07/12/18 240 lb (108.9 kg)   05/20/18 262 lb (118.8 kg)   01/16/18 262 lb (118.8 kg)              Today, you had the following     No orders found for display       Primary Care Provider Fax #    [facility] [phone] [address] [facility] [address]        Equal Access to Services     ELIUD HOOK : Aimee Griggs, talib gutierrez, qamargarita kaalmadimas bass, jose alejandro de la torre. So [facility] [phone].    ATENCIÓN: Si habla español, tiene a yoon disposición servicios gratuitos de asistencia lingüística. Llame al [phone].    We comply with applicable federal civil rights laws and [place] laws. We do not discriminate on the basis of race, color, national origin, age, disability, sex, sexual orientation, or gender identity.            Thank you!     Thank you for choosing [facility]  for your care. Our goal is always to provide you with excellent care. Hearing back from our patients is one way we can continue to improve our services. Please take a few minutes to complete the written survey that you may receive in the mail after your visit with us. Thank you!             Your Updated Medication List - Protect others around you: Learn how to safely use, store and throw away your medicines at www.disposemymeds.org.          This list is accurate as of 10/17/18  6:59 PM.  Always use your most recent med list.                   Brand Name Dispense Instructions for use Diagnosis    * albuterol (2.5 MG/3ML) 0.083% neb solution     360 mL    Take 1 vial (2.5 mg) by nebulization every 4 hours as needed for shortness  of breath / dyspnea or wheezing    Environmental allergies, Seasonal allergic rhinitis, unspecified allergic rhinitis trigger, Chronic allergic conjunctivitis       * VENTOLIN  (90 Base) MCG/ACT inhaler   Generic drug:  albuterol      INHALE 2 PUFFS EVERY 4HRS AS NEEDED        * albuterol 108 (90 Base) MCG/ACT inhaler    PROAIR HFA/PROVENTIL HFA/VENTOLIN HFA    1 Inhaler    Inhale 2 puffs into the lungs every 4 hours as needed for shortness of breath / dyspnea or wheezing    Exacerbation of persistent asthma, unspecified asthma severity       * albuterol (2.5 MG/3ML) 0.083% neb solution     1 Box    Take 1 vial (2.5 mg) by nebulization every 4 hours as needed for shortness of breath / dyspnea or wheezing    Uncomplicated asthma, unspecified asthma severity, unspecified whether persistent       CVS EYE ITCH RELIEF 0.025 % Soln ophthalmic solution   Generic drug:  ketotifen     5 mL    PLACE 2 DROPS INTO BOTH EYES 2 TIMES DAILY    Chronic allergic conjunctivitis       cyclobenzaprine 10 MG tablet    FLEXERIL    12 tablet    Take 0.5 tablets (5 mg) by mouth 3 times daily as needed for muscle spasms        * fluticasone 50 MCG/ACT spray    FLONASE    16 mL    SPRAY 2 SPRAYS INTO BOTH NOSTRILS DAILY    Seasonal allergic rhinitis, Environmental allergies       * fluticasone 50 MCG/ACT spray    FLONASE    1 Bottle    Spray 1-2 sprays into both nostrils daily    Chronic seasonal allergic rhinitis, unspecified trigger       * fluticasone 50 MCG/ACT spray    FLONASE    1 Bottle    Spray 2 sprays into both nostrils daily    Exacerbation of persistent asthma, unspecified asthma severity       lidocaine 5 % Patch    LIDODERM    30 patch    PA NEEDED. APPLY UP TO 3 PATCHES TO PAINFUL AREA AT ONCE FOR UP TO 12 HR WITHIN A 24 HR PERIOD    Chronic midline low back pain without sciatica       * loratadine-pseudoePHEDrine  MG per 24 hr tablet    CVS LORATADINE-D 24 HOUR    10 tablet    Take 1 tablet by mouth daily NEEDS  TO SEE MD    Chronic seasonal allergic rhinitis, unspecified trigger       * loratadine-pseudoePHEDrine  MG per 24 hr tablet    CLARITIN-D 24-hour    30 tablet    Take 1 tablet by mouth daily    Exacerbation of persistent asthma, unspecified asthma severity       metFORMIN 500 MG 24 hr tablet    GLUCOPHAGE-XR     Take 1 tablet by mouth 2 times daily        * montelukast 10 MG tablet    SINGULAIR    30 tablet    Take 1 tablet (10 mg) by mouth At Bedtime LAST REFILL SEE MD    Environmental allergies, Chronic seasonal allergic rhinitis, unspecified trigger       * montelukast 10 MG tablet    SINGULAIR    30 tablet    Take 1 tablet (10 mg) by mouth At Bedtime    Exacerbation of persistent asthma, unspecified asthma severity       * Notice:  This list has 11 medication(s) that are the same as other medications prescribed for you. Read the directions carefully, and ask your doctor or other care provider to review them with you.

## 2019-02-12 ENCOUNTER — MEDICAL CORRESPONDENCE (OUTPATIENT)
Dept: HEALTH INFORMATION MANAGEMENT | Facility: CLINIC | Age: 49
End: 2019-02-12

## 2019-02-12 ENCOUNTER — TRANSFERRED RECORDS (OUTPATIENT)
Dept: HEALTH INFORMATION MANAGEMENT | Facility: CLINIC | Age: 49
End: 2019-02-12

## 2019-03-14 ENCOUNTER — MEDICAL CORRESPONDENCE (OUTPATIENT)
Dept: HEALTH INFORMATION MANAGEMENT | Facility: CLINIC | Age: 49
End: 2019-03-14

## 2019-03-14 ENCOUNTER — DOCUMENTATION ONLY (OUTPATIENT)
Dept: CARE COORDINATION | Facility: CLINIC | Age: 49
End: 2019-03-14

## 2019-03-14 NOTE — TELEPHONE ENCOUNTER
FUTURE VISIT INFORMATION      FUTURE VISIT INFORMATION:    Date: 4/12/19    Time: 8AM (audio)/ 9:10AM (ENT)     Location: [facility]  REFERRAL INFORMATION:    Referring provider:  Dr Chris De La Vega    Referring providers clinic:  [facility]    Reason for visit/diagnosis  chronic mastoiditis,     RECORDS REQUESTED FROM:       Clinic name Comments Records Status Imaging Status   [facility] 2/12/19 ENT notes with Dr De La Vega    2/12/19 audio In [facility]   7/24/16 notes with Enrique Dai MD   Care Everywhere                                *no images

## 2019-04-11 DIAGNOSIS — H91.90 HEARING LOSS, UNSPECIFIED HEARING LOSS TYPE, UNSPECIFIED LATERALITY: Primary | ICD-10-CM

## 2019-04-12 ENCOUNTER — PRE VISIT (OUTPATIENT)
Dept: OTOLARYNGOLOGY | Facility: CLINIC | Age: 49
End: 2019-04-12

## 2020-01-01 NOTE — NURSING NOTE
"Chief Complaint   Patient presents with     Back Pain     Low Back       Initial /70 mmHg  Pulse 82  Temp(Src) 98.9  F (37.2  C) (Tympanic)  Resp 18  Ht 5' 5\" (1.651 m)  Wt 263 lb 2 oz (119.353 kg)  BMI 43.79 kg/m2  SpO2 98% Estimated body mass index is 43.79 kg/(m^2) as calculated from the following:    Height as of this encounter: 5' 5\" (1.651 m).    Weight as of this encounter: 263 lb 2 oz (119.353 kg).  BP completed using cuff size: large    Patient would like to be notified at the following phone number for results from this visit   [phone] OK to leave message  Shari Moura CMA (AAMA) 1/18/2017 2:26 PM      " [place] care, teaching, and plan of care reviewed with parents - questions answered.  Parents verbalized understanding of instructions.

## 2021-04-17 ENCOUNTER — HOSPITAL ENCOUNTER (OUTPATIENT)
Facility: CLINIC | Age: 51
Discharge: HOME | End: 2021-04-18
Attending: HOSPITALIST | Admitting: INTERNAL MEDICINE
Payer: COMMERCIAL

## 2021-04-17 ENCOUNTER — APPOINTMENT (OUTPATIENT)
Dept: ULTRASOUND IMAGING | Facility: CLINIC | Age: 51
End: 2021-04-17
Attending: HOSPITALIST
Payer: COMMERCIAL

## 2021-04-17 ENCOUNTER — TELEPHONE (OUTPATIENT)
Facility: CLINIC | Age: 51
End: 2021-04-17

## 2021-04-17 ENCOUNTER — TRANSFERRED RECORDS (OUTPATIENT)
Dept: HEALTH INFORMATION MANAGEMENT | Facility: CLINIC | Age: 51
End: 2021-04-17

## 2021-04-17 DIAGNOSIS — B37.2 YEAST INFECTION OF THE SKIN: ICD-10-CM

## 2021-04-17 DIAGNOSIS — N30.00 ACUTE CYSTITIS WITHOUT HEMATURIA: Primary | ICD-10-CM

## 2021-04-17 PROBLEM — L03.90 CELLULITIS: Status: ACTIVE | Noted: 2021-04-17

## 2021-04-17 LAB
ALBUMIN UR-MCNC: NEGATIVE MG/DL
ANION GAP SERPL CALCULATED.3IONS-SCNC: 7 MMOL/L (ref 3–14)
APPEARANCE UR: ABNORMAL
BACTERIA #/AREA URNS HPF: ABNORMAL /HPF
BILIRUB UR QL STRIP: NEGATIVE
BUN SERPL-MCNC: 21 MG/DL (ref 7–30)
CALCIUM SERPL-MCNC: 7.5 MG/DL (ref 8.5–10.1)
CHLORIDE SERPL-SCNC: 103 MMOL/L (ref 94–109)
CO2 SERPL-SCNC: 20 MMOL/L (ref 20–32)
COLOR UR AUTO: ABNORMAL
CREAT SERPL-MCNC: 0.9 MG/DL (ref 0.66–1.25)
D DIMER PPP FEU-MCNC: 0.6 UG/ML FEU (ref 0–0.5)
ERYTHROCYTE [DISTWIDTH] IN BLOOD BY AUTOMATED COUNT: 12.9 % (ref 10–15)
GFR SERPL CREATININE-BSD FRML MDRD: >90 ML/MIN/{1.73_M2}
GLUCOSE BLDC GLUCOMTR-MCNC: 211 MG/DL (ref 70–99)
GLUCOSE BLDC GLUCOMTR-MCNC: 253 MG/DL (ref 70–99)
GLUCOSE SERPL-MCNC: 256 MG/DL (ref 70–99)
GLUCOSE UR STRIP-MCNC: NEGATIVE MG/DL
HBA1C MFR BLD: 9.6 % (ref 0–5.6)
HCT VFR BLD AUTO: 35 % (ref 40–53)
HGB BLD-MCNC: 11.1 G/DL (ref 13.3–17.7)
HGB UR QL STRIP: ABNORMAL
INR PPP: 1.43 (ref 0.86–1.14)
KETONES UR STRIP-MCNC: 10 MG/DL
LACTATE BLD-SCNC: 0.9 MMOL/L (ref 0.7–2)
LEUKOCYTE ESTERASE UR QL STRIP: ABNORMAL
MCH RBC QN AUTO: 28.7 PG (ref 26.5–33)
MCHC RBC AUTO-ENTMCNC: 31.7 G/DL (ref 31.5–36.5)
MCV RBC AUTO: 90 FL (ref 78–100)
MUCOUS THREADS #/AREA URNS LPF: PRESENT /LPF
NITRATE UR QL: NEGATIVE
PH UR STRIP: 6 PH (ref 5–7)
PLATELET # BLD AUTO: 247 10E9/L (ref 150–450)
POTASSIUM SERPL-SCNC: 4.6 MMOL/L (ref 3.4–5.3)
PROCALCITONIN SERPL-MCNC: 34.75 NG/ML
RBC # BLD AUTO: 3.87 10E12/L (ref 4.4–5.9)
RBC #/AREA URNS AUTO: 6 /HPF (ref 0–2)
SODIUM SERPL-SCNC: 130 MMOL/L (ref 133–144)
SOURCE: ABNORMAL
SP GR UR STRIP: 1.02 (ref 1–1.03)
SQUAMOUS #/AREA URNS AUTO: <1 /HPF (ref 0–1)
TSH SERPL DL<=0.005 MIU/L-ACNC: 0.98 MU/L (ref 0.4–4)
UROBILINOGEN UR STRIP-MCNC: NORMAL MG/DL (ref 0–2)
WBC # BLD AUTO: 26.7 10E9/L (ref 4–11)
WBC #/AREA URNS AUTO: 128 /HPF (ref 0–5)

## 2021-04-17 PROCEDURE — 85027 COMPLETE CBC AUTOMATED: CPT | Performed by: HOSPITALIST

## 2021-04-17 PROCEDURE — 120N000001 HC R&B MED SURG/OB

## 2021-04-17 PROCEDURE — 99220 PR INITIAL OBSERVATION CARE,LEVEL III: CPT | Performed by: HOSPITALIST

## 2021-04-17 PROCEDURE — 96372 THER/PROPH/DIAG INJ SC/IM: CPT | Performed by: HOSPITALIST

## 2021-04-17 PROCEDURE — 999N001017 HC STATISTIC GLUCOSE BY METER IP

## 2021-04-17 PROCEDURE — 84145 PROCALCITONIN (PCT): CPT | Performed by: HOSPITALIST

## 2021-04-17 PROCEDURE — 250N000012 HC RX MED GY IP 250 OP 636 PS 637: Performed by: HOSPITALIST

## 2021-04-17 PROCEDURE — 83036 HEMOGLOBIN GLYCOSYLATED A1C: CPT | Performed by: HOSPITALIST

## 2021-04-17 PROCEDURE — 81001 URINALYSIS AUTO W/SCOPE: CPT | Performed by: HOSPITALIST

## 2021-04-17 PROCEDURE — 96375 TX/PRO/DX INJ NEW DRUG ADDON: CPT

## 2021-04-17 PROCEDURE — 250N000013 HC RX MED GY IP 250 OP 250 PS 637: Performed by: HOSPITALIST

## 2021-04-17 PROCEDURE — 87086 URINE CULTURE/COLONY COUNT: CPT | Performed by: HOSPITALIST

## 2021-04-17 PROCEDURE — 93970 EXTREMITY STUDY: CPT

## 2021-04-17 PROCEDURE — 99207 PR CDG-CODE CATEGORY CHANGED: CPT | Performed by: HOSPITALIST

## 2021-04-17 PROCEDURE — 96374 THER/PROPH/DIAG INJ IV PUSH: CPT

## 2021-04-17 PROCEDURE — 83605 ASSAY OF LACTIC ACID: CPT | Performed by: HOSPITALIST

## 2021-04-17 PROCEDURE — 96376 TX/PRO/DX INJ SAME DRUG ADON: CPT

## 2021-04-17 PROCEDURE — 84443 ASSAY THYROID STIM HORMONE: CPT | Performed by: HOSPITALIST

## 2021-04-17 PROCEDURE — 250N000009 HC RX 250: Performed by: HOSPITALIST

## 2021-04-17 PROCEDURE — 250N000011 HC RX IP 250 OP 636: Performed by: HOSPITALIST

## 2021-04-17 PROCEDURE — 80048 BASIC METABOLIC PNL TOTAL CA: CPT | Performed by: HOSPITALIST

## 2021-04-17 PROCEDURE — 36415 COLL VENOUS BLD VENIPUNCTURE: CPT | Performed by: HOSPITALIST

## 2021-04-17 PROCEDURE — 258N000003 HC RX IP 258 OP 636: Performed by: HOSPITALIST

## 2021-04-17 PROCEDURE — 85379 FIBRIN DEGRADATION QUANT: CPT | Performed by: HOSPITALIST

## 2021-04-17 PROCEDURE — 85610 PROTHROMBIN TIME: CPT | Performed by: HOSPITALIST

## 2021-04-17 RX ORDER — NICOTINE POLACRILEX 4 MG
15-30 LOZENGE BUCCAL
Status: DISCONTINUED | OUTPATIENT
Start: 2021-04-17 | End: 2021-04-18 | Stop reason: HOSPADM

## 2021-04-17 RX ORDER — MONTELUKAST SODIUM 10 MG/1
10 TABLET ORAL
COMMUNITY

## 2021-04-17 RX ORDER — ACETAMINOPHEN 325 MG/1
650 TABLET ORAL EVERY 4 HOURS PRN
Status: DISCONTINUED | OUTPATIENT
Start: 2021-04-17 | End: 2021-04-18 | Stop reason: HOSPADM

## 2021-04-17 RX ORDER — CLINDAMYCIN PHOSPHATE 900 MG/50ML
900 INJECTION, SOLUTION INTRAVENOUS EVERY 8 HOURS
Status: DISCONTINUED | OUTPATIENT
Start: 2021-04-17 | End: 2021-04-18 | Stop reason: HOSPADM

## 2021-04-17 RX ORDER — GABAPENTIN 300 MG/1
300 CAPSULE ORAL DAILY PRN
COMMUNITY

## 2021-04-17 RX ORDER — ONDANSETRON 4 MG/1
4 TABLET, ORALLY DISINTEGRATING ORAL EVERY 6 HOURS PRN
Status: DISCONTINUED | OUTPATIENT
Start: 2021-04-17 | End: 2021-04-18 | Stop reason: HOSPADM

## 2021-04-17 RX ORDER — SODIUM CHLORIDE 9 MG/ML
INJECTION, SOLUTION INTRAVENOUS CONTINUOUS
Status: DISCONTINUED | OUTPATIENT
Start: 2021-04-17 | End: 2021-04-18

## 2021-04-17 RX ORDER — LANOLIN ALCOHOL/MO/W.PET/CERES
3 CREAM (GRAM) TOPICAL
Status: DISCONTINUED | OUTPATIENT
Start: 2021-04-17 | End: 2021-04-18 | Stop reason: HOSPADM

## 2021-04-17 RX ORDER — DEXTROSE MONOHYDRATE 25 G/50ML
25-50 INJECTION, SOLUTION INTRAVENOUS
Status: DISCONTINUED | OUTPATIENT
Start: 2021-04-17 | End: 2021-04-18 | Stop reason: HOSPADM

## 2021-04-17 RX ORDER — BISACODYL 10 MG
10 SUPPOSITORY, RECTAL RECTAL DAILY PRN
Status: DISCONTINUED | OUTPATIENT
Start: 2021-04-17 | End: 2021-04-18 | Stop reason: HOSPADM

## 2021-04-17 RX ORDER — AMMONIUM LACTATE 12 G/100G
CREAM TOPICAL 2 TIMES DAILY
COMMUNITY

## 2021-04-17 RX ORDER — CEFAZOLIN SODIUM 2 G/100ML
2 INJECTION, SOLUTION INTRAVENOUS EVERY 8 HOURS
Status: DISCONTINUED | OUTPATIENT
Start: 2021-04-17 | End: 2021-04-17

## 2021-04-17 RX ORDER — AMOXICILLIN 250 MG
2 CAPSULE ORAL 2 TIMES DAILY PRN
Status: DISCONTINUED | OUTPATIENT
Start: 2021-04-17 | End: 2021-04-18 | Stop reason: HOSPADM

## 2021-04-17 RX ORDER — ALBUTEROL SULFATE 90 UG/1
2 AEROSOL, METERED RESPIRATORY (INHALATION) EVERY 4 HOURS PRN
Status: DISCONTINUED | OUTPATIENT
Start: 2021-04-17 | End: 2021-04-18 | Stop reason: HOSPADM

## 2021-04-17 RX ORDER — CEFTRIAXONE 1 G/1
1 INJECTION, POWDER, FOR SOLUTION INTRAMUSCULAR; INTRAVENOUS EVERY 24 HOURS
Status: DISCONTINUED | OUTPATIENT
Start: 2021-04-17 | End: 2021-04-18

## 2021-04-17 RX ORDER — OLOPATADINE HYDROCHLORIDE 2 MG/ML
1 SOLUTION/ DROPS OPHTHALMIC DAILY
COMMUNITY

## 2021-04-17 RX ORDER — LIDOCAINE 50 MG/G
1-3 PATCH TOPICAL EVERY 24 HOURS
COMMUNITY

## 2021-04-17 RX ORDER — POLYETHYLENE GLYCOL 3350 17 G/17G
17 POWDER, FOR SOLUTION ORAL 2 TIMES DAILY PRN
Status: DISCONTINUED | OUTPATIENT
Start: 2021-04-17 | End: 2021-04-18 | Stop reason: HOSPADM

## 2021-04-17 RX ORDER — AMOXICILLIN 250 MG
1 CAPSULE ORAL 2 TIMES DAILY PRN
Status: DISCONTINUED | OUTPATIENT
Start: 2021-04-17 | End: 2021-04-18 | Stop reason: HOSPADM

## 2021-04-17 RX ORDER — ONDANSETRON 2 MG/ML
4 INJECTION INTRAMUSCULAR; INTRAVENOUS EVERY 6 HOURS PRN
Status: DISCONTINUED | OUTPATIENT
Start: 2021-04-17 | End: 2021-04-18 | Stop reason: HOSPADM

## 2021-04-17 RX ADMIN — INSULIN ASPART 2 UNITS: 100 INJECTION, SOLUTION INTRAVENOUS; SUBCUTANEOUS at 13:28

## 2021-04-17 RX ADMIN — SODIUM CHLORIDE: 9 INJECTION, SOLUTION INTRAVENOUS at 10:34

## 2021-04-17 RX ADMIN — CEFAZOLIN SODIUM 2 G: 2 INJECTION, SOLUTION INTRAVENOUS at 11:46

## 2021-04-17 RX ADMIN — CLINDAMYCIN PHOSPHATE 900 MG: 900 INJECTION, SOLUTION INTRAVENOUS at 23:33

## 2021-04-17 RX ADMIN — CEFTRIAXONE 1 G: 1 INJECTION, POWDER, FOR SOLUTION INTRAMUSCULAR; INTRAVENOUS at 14:35

## 2021-04-17 RX ADMIN — SODIUM CHLORIDE: 9 INJECTION, SOLUTION INTRAVENOUS at 22:24

## 2021-04-17 RX ADMIN — MICONAZOLE NITRATE: 20 POWDER TOPICAL at 13:33

## 2021-04-17 RX ADMIN — CLINDAMYCIN PHOSPHATE 900 MG: 900 INJECTION, SOLUTION INTRAVENOUS at 16:35

## 2021-04-17 ASSESSMENT — ACTIVITIES OF DAILY LIVING (ADL)
ADLS_ACUITY_SCORE: 14
ADLS_ACUITY_SCORE: 17
ADLS_ACUITY_SCORE: 14

## 2021-04-17 ASSESSMENT — MIFFLIN-ST. JEOR: SCORE: 1967.12

## 2021-04-17 NOTE — PROGRESS NOTES
Follow-up note    UA positive, indicative of UTI. Change Ancef to Rocephin/Clinda to cover for UTI and cellulitis.    Nasim Rivera,   April 17, 2021

## 2021-04-17 NOTE — PLAN OF CARE
Pt admitted directly from the [facility] in [place], [place]. Pat states he and wife had been visiting a friend when he began not feeling well.  In the ED he was febrile, 103.2, hypotensive and tachycardic. RLE cellulitis was noted and marked.  Patient also has a reddened raw area to mid abdominal/pannus.  This area was cleansed with wound cleanser spray, pat dry and miconozole cream applied. Labs here showed abnormal Procalcitonin 34.75, WBC 26.7, CRP 18.22, A1C 9.6, Na 130,  and a (+) UA.  Pt was started initially on Ancef.  Now changed to IV Cleocin and Rocephin. Vitals signs here have been WDL and patient is afebrile.  NS  is running at 100ml/hr. Pt is eating a regular diet.  He was educated on basic diabetes control and blood glucose testing prior to meals.  Pt did eat dinner despite the instruction to get BG tested first. US of legs was performed and appears normal.  Will continue to provide education and instruction on diabetic control information. Spouse supportive at bedside.

## 2021-04-17 NOTE — PROGRESS NOTES
DATE:  4/17/2021   TIME OF RECEIPT FROM LAB:  4371  LAB TEST:  Procalcitonin  LAB VALUE:  34.75  RESULTS GIVEN WITH READ-BACK TO (PROVIDER):  Nasim Rivera, DO  TIME LAB VALUE REPORTED TO PROVIDER:   [id number]

## 2021-04-17 NOTE — PHARMACY-ADMISSION MEDICATION HISTORY
"Admission medication history interview status for this patient is complete. See [facility] admission navigator for allergy information, prior to admission medications and immunization status.     Medication history interview done, indicate source(s): Patient's wife, Ruby  Medication history resources (including written lists, pill bottles, clinic record): care everywhere  Pharmacy: [facility]    Changes made to PTA medication list:  Added: ammonium lactate, gabapentin, olopatadine  Deleted: duplicate albuterol neb, duplicate flonase (x2), claritin-d, [facility] generic claritin-d, metformin, duplicate montelukast  Changed: montelukast from scheduled to prn, claritin-d 24 hour to 12 hr    Actions taken by pharmacist (provider contacted, etc):None     Additional medication history information: Patient's wife reported that the patient was just prescribed cyclobenzaprine on 4/15, and took only 1 cyclobenzaprine tablet on Thursday, and it \"knocked him out.\" The patient's wife explained that he takes either claritin-d or montelukast as needed; on days he takes claritin-d, he doesn't take montelukast, and on days he takes montelukast, he doesn't take claritin-d.     The patient was started on azithromycin 250 mg - 2 tablets daily on 4/10, and then 1 tablet daily for 4 days. He finished this antibiotic, and it didn't clear the infection. The patient then started on bactrim 800-160 mg - 2 tablets BID; the patient took the first 2 tablets on 4/16, and then 6-7 hours later, came into the hospital. The patient reported taking those 2 bactrim tablets at 1500 yesterday.    Medication reconciliation/reorder completed by provider prior to medication history?  Y   (Y/N)     Prior to Admission medications    Medication Sig Last Dose Taking? Auth Provider   ammonium lactate (AMLACTIN) 12 % external cream Apply topically 2 times daily Past Week at Unknown time Yes Unknown, Entered By History   cyclobenzaprine (FLEXERIL) 10 MG tablet " Take 0.5 tablets (5 mg) by mouth 3 times daily as needed for muscle spasms 4/15/2021 Yes Jana Duarte APRN CNP   diclofenac (VOLTAREN) 1 % topical gel Apply topically 2 times daily as needed  Past Week at Unknown time Yes Unknown, Entered By History   fluticasone (FLONASE) 50 MCG/ACT spray SPRAY 2 SPRAYS INTO BOTH NOSTRILS DAILY 4/15/2021 Yes Krishna Lee MD   gabapentin (NEURONTIN) 300 MG capsule Take 300 mg by mouth daily as needed Past Week at Unknown time Yes Unknown, Entered By History   lidocaine (LIDODERM) 5 % patch Place 1-3 patches onto the skin every 24 hours To prevent lidocaine toxicity, patient should be patch free for 12 hrs daily. 4/15/2021 Yes Unknown, Entered By History   loratadine-pseudoePHEDrine (CLARITIN-D 12-HOUR) 5-120 MG 12 hr tablet Take 1 tablet by mouth daily as needed 4/15/2021 Yes Unknown, Entered By History   montelukast (SINGULAIR) 10 MG tablet Take 10 mg by mouth nightly as needed 4/15/2021 at pm Yes Unknown, Entered By History   olopatadine (PATADAY) 0.2 % ophthalmic solution Place 1 drop into both eyes daily 4/15/2021 Yes Unknown, Entered By History   albuterol (2.5 MG/3ML) 0.083% neb solution Take 1 vial (2.5 mg) by nebulization every 4 hours as needed for shortness of breath / dyspnea or wheezing More than a month at Unknown time  Krishna Lee MD   albuterol (PROAIR HFA/PROVENTIL HFA/VENTOLIN HFA) 108 (90 BASE) MCG/ACT Inhaler Inhale 2 puffs into the lungs every 4 hours as needed for shortness of breath / dyspnea or wheezing More than a month at Unknown time  Olamide Peter MD

## 2021-04-17 NOTE — H&P
HISTORY AND PHYSICAL    Admitted:     04/17/2021      CHIEF COMPLAINT:  Fever.      HISTORY OF PRESENT ILLNESS:  This is a 51-year-old male with a history of obesity, type 2 diabetes mellitus, COPD, tobacco dependence, chronic back pain, and allergies, presenting to [facility] for evaluation of fever.  History is obtained from documentation of Dr. Zhou.  I also obtained history from the patient and his wife at the bedside.  The electronic medical record was also reviewed.      The patient has been feeling poorly for the past 3-4 days.  He was actually evaluated on 04/16 for worsening cough and treated with azithromycin earlier in the week for bronchitis.  Exam and imaging were fairly unremarkable for any acute process.  He has been reporting some subjective fevers as well as chills and fatigue.  He admits to anorexia and decreased appetite.  He also has noticed worsening right lower extremity erythema and mild discomfort.  On evaluation, he was noticed to have right lower extremity findings concerning for cellulitis and was discharged with Bactrim.  He returned later in the day due to ongoing chills with nausea and vomiting.  He had vital signs indicative of sepsis.  Vital signs were x3 point where temperature 103.2, heart rate 112/67, respiratory rate 22, heart rate 140, and oxygenation 94% on room air.  Blood pressure did eventually dropped to 96/55 with improved heart rate of 96 and a respiratory rate of 20-22.  Oxygenation remained stable and fever resolved.  He received some acetaminophen and IV fluids.  He also was treated with vancomycin and cefepime.  He has completed his full course of azithromycin.  He was given 3 liters of IV fluids at the urgent care.  Laboratory work was notable for leukocytosis of 23.5, hemoglobin 12.6, platelets 238,000, sodium 128, potassium 4.5, glucose 232, BUN 26, creatinine 1.1, bicarbonate 24, normal liver function tests and lipase.  Lactic acid was 1.3 and blood  cultures were sent.  CRP was 18.2 and a D-dimer 1.29.  CT of the chest, abdomen, and pelvis with contrast showed no acute pathology.  Chest x-ray showed no acute pathology and COVID-19 testing was negative.  Urinalysis was sent and is pending.  He was thought to have sepsis secondary to right lower extremity cellulitis and was directly admitted to this facility this morning.      PAST MEDICAL HISTORY:   1.  Tobacco dependence.   2.  Poorly controlled type 2 diabetes mellitus  3.  Morbid obesity  4.  COPD.      SOCIAL HISTORY:  The patient does smoke.  Drinks occasionally, but denies drug use.   and wife at the bedside.      FAMILY HISTORY:  Assessed and noncontributory.      ALLERGIES:  DUST MITES, MOLD, PENICILLIN, POLLEN, Zyrtec.      REVIEW OF SYSTEMS:  A 10-point review of systems was performed and the pertinent and positive findings are presented in history of present illness.  The remainder of the review of systems is negative.      PHYSICAL EXAMINATION:   VITAL SIGNS:  Temperature 98.7, heart rate 94, respiratory rate 20, oxygen saturation 97% on room air, blood pressure 113/64.   GENERAL:  No apparent distress, awake, alert and oriented x 3.   HEENT:  Normocephalic, atraumatic.  Extraocular muscles intact.   NECK:  Supple, without JVD.   CARDIOVASCULAR:  Regular rate and rhythm without murmurs, rubs or gallops.   PULMONARY:  Clear to auscultation bilaterally.   ABDOMEN:  Soft, nontender, nondistended, bowel sounds are present.   EXTREMITIES:  Right lower extremity is erythematous:  Warm.  Trace edema bilaterally, but slightly more edema on the right.  No cyanosis or clubbing.   SKIN:  Changes of the right lower extremity as described above.  No other ulcers, rash or jaundice.   NEUROLOGICAL:  Cranial nerves II-XII are grossly intact.  No focal neurological deficits.   PSYCHIATRIC:  Mood and affect are appropriate.      LABORATORY AND IMAGING:  Personally discussed pertinent findings in the HPI.       ASSESSMENT AND PLAN:  This is a 51-year-old male with a history of COPD, tobacco dependence, type 2 diabetes mellitus and obesity, presenting to [facility] for evaluation of sepsis secondary to right lower extremity cellulitis.   1.  Sepsis secondary to right lower extremity cellulitis.   VITAL SIGNS indicate fairly dramatic improvement since evaluation  overnight at the outside facility.  He is now afebrile with stable heart rate and blood pressure.  We will continue normal saline at 125 mL per hour.  He did receive about 3 liters at the outside hospital.  I believe that the etiology of this infection is right lower extremity cellulitis.  Given the erythema, tenderness, and warmth.  We will continue Ancef, but need to be mindful of his PENICILLIN ALLERGY.  I think we can avoid extremely broad-spectrum antibiotics at this time.  I would like to repeat laboratory work including BMP, CBC, lactic acid, procalcitonin, CRP, D-dimer, and INR.  Given the elevated D-dimer at the outside hospital.  We will check bilateral lower extremity ultrasound with a fairly low suspicion for DVT.  To note, chest x-ray was clear.  CT of the chest, abdomen and pelvis was unremarkable.  A urinalysis was sent and we will repeat that here.  I do not have the results available, but he is not endorsing any urinary symptoms.  Furthermore COVID-19 testing was negative.   2.  Recent cough:  Treated with a complete course of azithromycin for possible bacterial bronchitis.  We will perform an infectious workup as above, but lungs are currently clear on examination and recent imaging.  COVID-19 testing was negative as well.   3.  Diabetes mellitus:  We will await medication reconciliation in the meantime, have him on a medium sliding scale insulin.  Blood sugar slightly elevated in the mid 200s.   4.  Chronic obstructive pulmonary disease and tobacco dependence:  Cessation encouraged.  Lungs are currently clear.  Continue prior to  admission inhalers.   5.  Seasonal allergies:  Resume prior to admission antihistamines after medication reconciliation.   6.  Hyponatremia:  Likely due to sepsis and volume depletion.  We will recheck basic metabolic panel at this time and continue normal saline as above.   7.  The patient will be admitted to inpatient status on expected greater than 2-midnight hospitalization.      CODE STATUS:  Full code as discussed with the patient.         JOVAN VÁZQUEZ MD             D: 2021   T: 2021   MT: DESEAN      Name:     KATINA REYES   MRN:      [id number]        Account:      [id number]   :      1970        Admitted:     2021                   Document: [id number]

## 2021-04-17 NOTE — TELEPHONE ENCOUNTER
Mr. Martir Barrientos is a 50 yo male with PMHx tobacco abuse, Type DMII (poory controlled A1c 11.2 4/8/21), morbid obesity, and COPD being evaluated at [facility]. Requesting direct admission to [facility] 4/17/21.     Patient initially evaluated 4/16/21 for worsening cough. Treated with Azithromycin earlier in the week, bronchitis. However, exam and imaging without acute process. Reports fatigue and worsening chills. His exam showed erythema and warmth of RLE.  Found to have cellulitis of the right lower extremity and was discharged with Bactrim. Patient returned due to worsening chills and vomiting. Patient was now septic.   Patient is a resident of [place], [place] but is in the area visiting friends.     VS on arrival: Temp 103.2, /67 , RR 22 Pulse 94% on RA.     VS currently: 96/55 (MAP 68), HR 96, RR 20-22, Oxygenation stable on room air. Afebrile now (99).    Blood pressure follow 3 L: 90/66 (MAP 70)     Labs/Imaging:   CBC: WBC 23.5, Hemoglobin 12.6, Plts 238  BMP: Na 128, Potassium 4.5, Glucose 232, BUN 26, Creatinine 1.1. Bicarb 24. LFTs. Lipase wnl.   Lactic acid 1.3  Blood cultures obtained  CRP 18.2  D-dimer 1.29   CT PE abdomen/pelvis: No acute pathology   CXR: No acute pathology from 4/16 afternoon  COVID-19 negative  UA pending.     Treated with vancomycin, acetaminophen, Zofran. IVF 30 ml/kg (slighly over 3 L). PCN allergy treated with Zithromax last week - complete course.     Access: 2 peripheral IVs.     Assessment/Plan:   Sepsis secondary to right lower extremity cellulitis: Vancomycin given. Cefepime to be given prior to transfer. Continue IVF bolus. Vitals check prior to transfer given soft pressures but stable vitals. Instructed to call if cultures positive. UA pending but low suspicion for urine source given lower extremity findings    Elevated d-dimer: CT PE negative for PE or acute pathology. Unable to obtain LE US. Consider on arrival to [facility].     *CareEverywhere  updated. Records present.     Care Everywhere Chart Review:   4/15/21 [facility]: Patient refused medical care and left AMA from the ED

## 2021-04-18 VITALS
SYSTOLIC BLOOD PRESSURE: 119 MMHG | TEMPERATURE: 97.6 F | BODY MASS INDEX: 40.04 KG/M2 | DIASTOLIC BLOOD PRESSURE: 69 MMHG | HEART RATE: 77 BPM | WEIGHT: 255.1 LBS | OXYGEN SATURATION: 97 % | RESPIRATION RATE: 18 BRPM | HEIGHT: 67 IN

## 2021-04-18 PROBLEM — B37.2 YEAST INFECTION OF THE SKIN: Status: ACTIVE | Noted: 2021-04-18

## 2021-04-18 PROBLEM — N30.00 ACUTE CYSTITIS WITHOUT HEMATURIA: Status: ACTIVE | Noted: 2021-04-18

## 2021-04-18 LAB
ANION GAP SERPL CALCULATED.3IONS-SCNC: 5 MMOL/L (ref 3–14)
BACTERIA SPEC CULT: NORMAL
BUN SERPL-MCNC: 10 MG/DL (ref 7–30)
CALCIUM SERPL-MCNC: 8.1 MG/DL (ref 8.5–10.1)
CHLORIDE SERPL-SCNC: 105 MMOL/L (ref 94–109)
CO2 SERPL-SCNC: 24 MMOL/L (ref 20–32)
CREAT SERPL-MCNC: 0.58 MG/DL (ref 0.66–1.25)
ERYTHROCYTE [DISTWIDTH] IN BLOOD BY AUTOMATED COUNT: 12.9 % (ref 10–15)
GFR SERPL CREATININE-BSD FRML MDRD: >90 ML/MIN/{1.73_M2}
GLUCOSE BLDC GLUCOMTR-MCNC: 198 MG/DL (ref 70–99)
GLUCOSE BLDC GLUCOMTR-MCNC: 204 MG/DL (ref 70–99)
GLUCOSE BLDC GLUCOMTR-MCNC: 208 MG/DL (ref 70–99)
GLUCOSE SERPL-MCNC: 208 MG/DL (ref 70–99)
HCT VFR BLD AUTO: 32.3 % (ref 40–53)
HGB BLD-MCNC: 10.4 G/DL (ref 13.3–17.7)
Lab: NORMAL
MCH RBC QN AUTO: 28.5 PG (ref 26.5–33)
MCHC RBC AUTO-ENTMCNC: 32.2 G/DL (ref 31.5–36.5)
MCV RBC AUTO: 89 FL (ref 78–100)
PLATELET # BLD AUTO: 254 10E9/L (ref 150–450)
POTASSIUM SERPL-SCNC: 4.3 MMOL/L (ref 3.4–5.3)
RBC # BLD AUTO: 3.65 10E12/L (ref 4.4–5.9)
SODIUM SERPL-SCNC: 134 MMOL/L (ref 133–144)
SPECIMEN SOURCE: NORMAL
WBC # BLD AUTO: 14.5 10E9/L (ref 4–11)

## 2021-04-18 PROCEDURE — 999N001017 HC STATISTIC GLUCOSE BY METER IP

## 2021-04-18 PROCEDURE — 250N000009 HC RX 250: Performed by: HOSPITALIST

## 2021-04-18 PROCEDURE — 96372 THER/PROPH/DIAG INJ SC/IM: CPT

## 2021-04-18 PROCEDURE — 99217 PR OBSERVATION CARE DISCHARGE: CPT | Performed by: INTERNAL MEDICINE

## 2021-04-18 PROCEDURE — 250N000011 HC RX IP 250 OP 636: Performed by: INTERNAL MEDICINE

## 2021-04-18 PROCEDURE — 80048 BASIC METABOLIC PNL TOTAL CA: CPT | Performed by: HOSPITALIST

## 2021-04-18 PROCEDURE — 96376 TX/PRO/DX INJ SAME DRUG ADON: CPT

## 2021-04-18 PROCEDURE — 36415 COLL VENOUS BLD VENIPUNCTURE: CPT | Performed by: HOSPITALIST

## 2021-04-18 PROCEDURE — 85027 COMPLETE CBC AUTOMATED: CPT | Performed by: HOSPITALIST

## 2021-04-18 RX ORDER — LEVOFLOXACIN 500 MG/1
500 TABLET, FILM COATED ORAL DAILY
Qty: 10 TABLET | Refills: 0 | Status: SHIPPED | OUTPATIENT
Start: 2021-04-18

## 2021-04-18 RX ORDER — CLOTRIMAZOLE 1 %
CREAM (GRAM) TOPICAL 2 TIMES DAILY
Qty: 45 G | Refills: 1 | Status: SHIPPED | OUTPATIENT
Start: 2021-04-18

## 2021-04-18 RX ORDER — CEFTRIAXONE 2 G/1
2 INJECTION, POWDER, FOR SOLUTION INTRAMUSCULAR; INTRAVENOUS EVERY 24 HOURS
Status: DISCONTINUED | OUTPATIENT
Start: 2021-04-18 | End: 2021-04-18 | Stop reason: HOSPADM

## 2021-04-18 RX ADMIN — INSULIN ASPART 2 UNITS: 100 INJECTION, SOLUTION INTRAVENOUS; SUBCUTANEOUS at 08:13

## 2021-04-18 RX ADMIN — CLINDAMYCIN PHOSPHATE 900 MG: 900 INJECTION, SOLUTION INTRAVENOUS at 06:54

## 2021-04-18 RX ADMIN — INSULIN ASPART 2 UNITS: 100 INJECTION, SOLUTION INTRAVENOUS; SUBCUTANEOUS at 12:09

## 2021-04-18 RX ADMIN — CEFTRIAXONE 2 G: 2 INJECTION, POWDER, FOR SOLUTION INTRAMUSCULAR; INTRAVENOUS at 12:09

## 2021-04-18 ASSESSMENT — ACTIVITIES OF DAILY LIVING (ADL)
ADLS_ACUITY_SCORE: 14

## 2021-04-18 ASSESSMENT — MIFFLIN-ST. JEOR: SCORE: 1965.76

## 2021-04-18 NOTE — PROGRESS NOTES
Pt seen and examined.  Spouse present in room    Pt insists he is leaving the hospital today and is not going to stay in the hospital another evening.  He feels much better.  Redness of RLE resolved.  Suspect primary issue is likely UTI.  Also has yeast infection of skin folds noted    Will discharge home today after receives dose of Rocephin today    I have recommended outpt f/u with urology regarding his UTI

## 2021-04-18 NOTE — PROGRESS NOTES
Cross Cx: Patient urinating frequently and requesting IVF to be saline locked. Discontinued IVF overnight. Reassess in AM if they should be resumed.

## 2021-04-18 NOTE — UTILIZATION REVIEW
"  Admission Status; Secondary Review Determination         Under the authority of the Utilization Management Committee, the utilization review process indicated a secondary review on the above patient.  The review outcome is based on review of the medical records, discussions with staff, and applying clinical experience noted on the date of the review.          (x) Observation Status Appropriate - This patient does not meet hospital inpatient criteria and is placed in observation status. If this patient's primary payer is Medicare and was admitted as an inpatient, Condition Code 44 should be used and patient status changed to \"observation\".     RATIONALE FOR DETERMINATION     The severity of illness, intensity of service provided, expected LOS and risk for adverse outcome make the care appropriate for further observation; however, doesn't meet criteria for hospital inpatient admission.   notified of this determination.    The patient is a 51-year-old male who was admitted on 4/17/2021.  He had fever and had been feeling poorly for 3 to 4 days prior to admission.  He was a transfer from an outside hospital.  He is found to have right lower extremity signs of cellulitis.  His blood pressure had dropped to 96/55 and he had significant signs for sepsis including a procalcitonin of 34.75 and a white count of 26.7.  He was also found to have a UTI with large leukocyte esterase.  Culture and sensitivities are pending.  He had originally been started on Ancef thinking cellulitis was the infection of concern.  He was switched to Rocephin and clindamycin.  He improved significantly between yesterday and today and is requesting to be discharged from the hospital.  The case was discussed with Jesus Tian MD.  He also has poorly controlled diabetes mellitus type 2.  Despite symptoms and signs consistent with significant infection likely systemic, the patient requests discharge and likely that will be the course.  " Jesus Tian MD will switch him to observation if he is discharged today.      The information on this document is developed by the utilization review team in order for the business office to ensure compliance.  This only denotes the appropriateness of proper admission status and does not reflect the quality of care rendered.         The definitions of Inpatient Status and Observation Status used in making the determination above are those provided in the CMS Coverage Manual, Chapter 1 and Chapter 6, section 70.4.      Sincerely,     Klaus Eden MD  Physician Advisor  Utilization Review/ Case Management  [facility].

## 2021-04-18 NOTE — PLAN OF CARE
End of Shift Summary  For vital signs and complete assessments, please see documentation flowsheets.     Pertinent assessments: VSS, afebrile, room air, denies any pain/nausea, spouse at bedside--he is her caregiver--hospital ANS approved spouse to be able to stay the night  Right lower extremity outlined redness is well within marking, abd folds red and excoriated--powder applied, rocephin and cleocin for UTI and cellulitis     Major Shift Events uneventful, slept well between cares, discontinued IVF due to pt urinating 2x per hour clear yellow urine      Treatment Plan: rocephin, cleocin, diabetes education  needed, monitor labs and vital signs     Bedside Nurse: Nalini Ramirez RN

## 2021-04-18 NOTE — PLAN OF CARE
Patient hospitalized for cellulitis. Cleared for discharge to home today per MD. Discharge instructions, medications, and follow-ups reviewed with patient and wife Ruby in detail. Discharge medications, including clotrimazole, levoflaxcin, and miconazole were filled at [facility]. Patient and wife Ruby verbalized understanding of discharge instructions. Belongings were returned to patient at time of discharge. Patients wife providing transport home.

## 2021-04-19 NOTE — DISCHARGE SUMMARY
Admit Date:     04/17/2021   Discharge Date:     04/18/2021      PRINCIPAL FINAL DIAGNOSES:   1. Sepsis, suspect secondary to urinary tract infection.   2. Mild right lower extremity cellulitis on presentation, resolved by time of discharge.   3. Leukocytosis secondary to sepsis.   4. Hyponatremia due to dehydration.   5. Suboptimally controlled diabetes mellitus type 2.  Hemoglobin A1c near 9.5 this admission.      PAST MEDICAL HISTORY:   1. Tobacco dependence.   2. Morbid obesity.   3. Chronic obstructive pulmonary disease.      PRINCIPAL PROCEDURES THIS ADMISSION:   1. Urine culture pending at the time of this dictation.   2. Bilateral lower extremity ultrasound showed no evidence of DVT.   3. Intravenous antibiotics.      REASON FOR ADMISSION:  Please see dictated history and physical.  In brief, Mr. Vazquez is a 51-year-old male who presented to the hospital for concerns about fever.  Please see dictated history and physical for details.      HOSPITAL COURSE:   1. Sepsis:  On presentation, the patient was noted to have white blood cell count over 20,000.  He did not have a fever.  He was hemodynamically stable during his hospitalization.  Procalcitonin level was noted to be elevated at 34.  Urinalysis showed pyuria.  The patient was treated with IV Rocephin and IV clindamycin while hospitalized.  Initial concern for possible right lower extremity cellulitis as a cause of his sepsis.  However, urinalysis did come back showing pyuria.  The patient noted increased urine frequency recently and I suspect UTI is likely the unifying diagnosis here as a cause of his sepsis.  Redness of his right lower extremity was small on admission and had resolved by day of discharge.  I doubt cellulitis the significant factor here.      The patient was very insistent on leaving the hospital today and said he would not stay another night in the hospital.  Clinically, he actually looked well.  He was hemodynamically stable, had no  fevers.  His white blood cell count had improved to 14,000 from 26,000 on admission.  Urine cultures are currently pending.  He will be discharged today on oral Levaquin for another 10 days of therapy.  I advised him to follow-up with Urology to further evaluate why he developed a UTI.  Imaging in the past has mentioned an enlarged prostate, so possible that this may be a factor.  I have placed a Urology consultation as an outpatient and he will be contacted to arrange an appointment.      The patient will be discharged to home today per his request.      DISCHARGE MEDICATIONS:   1. Albuterol as needed.   2. Clotrimazole apply to skin infection twice a day and skin folds until infection going on.  used to treat yeast infection.   3. Flexeril 5 mg 3 times a day as needed for muscle spasm.   4. Voltaren gel as needed.   5. Flonase spray into both nostrils daily.   6. Gabapentin 300 mg daily as needed.   7. Levaquin 500 mg daily for 10 days.   8. Lidoderm pain patch.   9. Claritin-D.   10. Miconazole powder, apply topically every hour to the skin folds for a yeast infection.   11. Montelukast 10 mg nightly as needed.   12. Pataday eyedrops 1 drop into both eyes daily.      FOLLOWUP INSTRUCTIONS:   1. Recommend followup with Urology after discharge to assess for cause of her bladder infection.  He will be contacted to arrange this appointment.   2. Continued us Miconazole powder and clotrimazole cream rash and skin folds until rash is gone.   3. Follow-up with primary MD within 5-7 days after leaving the hospital.  Recommend to discuss management of her diabetes  at that visit.   4. Urology consult has been ordered.  The patient will be contacted to arrange this appointment.  Referral was placed to assess etiology behind a UTI.      I examined the patient on day of discharge.            MAURO HERNANDES MD             D: 04/18/2021   T: 04/18/2021   MT:       Name:     KATINA REYES   MRN:      [id number]         Account:        [id number]   :      1970           Admit Date:     2021                                  Discharge Date: 2021      Document: [id number]

## 2021-07-05 DIAGNOSIS — N39.0 URINARY TRACT INFECTION: Primary | ICD-10-CM

## 2022-03-28 ENCOUNTER — LAB REQUISITION (OUTPATIENT)
Dept: LAB | Facility: CLINIC | Age: 52
End: 2022-03-28
Payer: COMMERCIAL

## 2022-03-28 DIAGNOSIS — Z13.220 ENCOUNTER FOR SCREENING FOR LIPOID DISORDERS: ICD-10-CM

## 2022-03-28 DIAGNOSIS — E11.65 TYPE 2 DIABETES MELLITUS WITH HYPERGLYCEMIA (H): ICD-10-CM

## 2022-03-28 LAB
ALBUMIN SERPL-MCNC: 3.2 G/DL (ref 3.5–5)
ALP SERPL-CCNC: 71 U/L (ref 45–120)
ALT SERPL W P-5'-P-CCNC: 12 U/L (ref 0–45)
ANION GAP SERPL CALCULATED.3IONS-SCNC: 11 MMOL/L (ref 5–18)
AST SERPL W P-5'-P-CCNC: 8 U/L (ref 0–40)
BILIRUB SERPL-MCNC: 0.5 MG/DL (ref 0–1)
BUN SERPL-MCNC: 9 MG/DL (ref 8–22)
CALCIUM SERPL-MCNC: 8.7 MG/DL (ref 8.5–10.5)
CHLORIDE BLD-SCNC: 101 MMOL/L (ref 98–107)
CHOLEST SERPL-MCNC: 160 MG/DL
CO2 SERPL-SCNC: 26 MMOL/L (ref 22–31)
CREAT SERPL-MCNC: 0.71 MG/DL (ref 0.7–1.3)
FASTING STATUS PATIENT QL REPORTED: ABNORMAL
GFR SERPL CREATININE-BSD FRML MDRD: >90 ML/MIN/1.73M2
GLUCOSE BLD-MCNC: 177 MG/DL (ref 70–125)
HDLC SERPL-MCNC: 22 MG/DL
LDLC SERPL CALC-MCNC: 118 MG/DL
POTASSIUM BLD-SCNC: 3.8 MMOL/L (ref 3.5–5)
PROT SERPL-MCNC: 6.4 G/DL (ref 6–8)
SODIUM SERPL-SCNC: 138 MMOL/L (ref 136–145)
TRIGL SERPL-MCNC: 99 MG/DL

## 2022-03-28 PROCEDURE — 80061 LIPID PANEL: CPT | Performed by: PHYSICIAN ASSISTANT

## 2022-03-28 PROCEDURE — 80053 COMPREHEN METABOLIC PANEL: CPT | Mod: ORL | Performed by: PHYSICIAN ASSISTANT

## 2024-07-12 PROBLEM — R06.83 SNORING: Status: ACTIVE | Noted: 2017-05-02

## 2024-07-12 PROBLEM — L03.115 CELLULITIS OF RIGHT LOWER EXTREMITY: Status: ACTIVE | Noted: 2020-07-31

## 2024-07-12 PROBLEM — Z13.6 ENCOUNTER FOR SCREENING FOR CARDIOVASCULAR DISORDERS: Status: ACTIVE | Noted: 2017-04-21

## 2024-07-12 PROBLEM — J44.1 COPD EXACERBATION (H): Status: ACTIVE | Noted: 2017-05-02

## 2024-07-12 PROBLEM — Z72.0 TOBACCO ABUSE: Status: ACTIVE | Noted: 2017-05-02
